# Patient Record
Sex: FEMALE | Race: WHITE | NOT HISPANIC OR LATINO | Employment: OTHER | URBAN - METROPOLITAN AREA
[De-identification: names, ages, dates, MRNs, and addresses within clinical notes are randomized per-mention and may not be internally consistent; named-entity substitution may affect disease eponyms.]

---

## 2017-07-10 ENCOUNTER — APPOINTMENT (OUTPATIENT)
Dept: GENERAL RADIOLOGY | Facility: CLINIC | Age: 72
End: 2017-07-10
Attending: FAMILY MEDICINE
Payer: MEDICARE

## 2017-07-10 ENCOUNTER — HOSPITAL ENCOUNTER (OUTPATIENT)
Facility: CLINIC | Age: 72
Setting detail: OBSERVATION
Discharge: HOME | End: 2017-07-11
Attending: FAMILY MEDICINE | Admitting: PEDIATRICS
Payer: MEDICARE

## 2017-07-10 ENCOUNTER — SURGERY (OUTPATIENT)
Age: 72
End: 2017-07-10

## 2017-07-10 DIAGNOSIS — R55 NEAR SYNCOPE: ICD-10-CM

## 2017-07-10 DIAGNOSIS — D62 ANEMIA DUE TO BLOOD LOSS, ACUTE: ICD-10-CM

## 2017-07-10 DIAGNOSIS — K92.2 ACUTE UPPER GI HEMORRHAGE: ICD-10-CM

## 2017-07-10 DIAGNOSIS — K25.4 GASTROINTESTINAL HEMORRHAGE ASSOCIATED WITH GASTRIC ULCER: ICD-10-CM

## 2017-07-10 DIAGNOSIS — N18.2 CKD (CHRONIC KIDNEY DISEASE) STAGE 2, GFR 60-89 ML/MIN: ICD-10-CM

## 2017-07-10 LAB
ABO + RH BLD: NORMAL
ABO + RH BLD: NORMAL
ALBUMIN SERPL-MCNC: 3 G/DL (ref 3.4–5)
ALP SERPL-CCNC: 51 U/L (ref 40–150)
ALT SERPL W P-5'-P-CCNC: 23 U/L (ref 0–50)
ANION GAP SERPL CALCULATED.3IONS-SCNC: 7 MMOL/L (ref 3–14)
AST SERPL W P-5'-P-CCNC: 18 U/L (ref 0–45)
BASOPHILS # BLD AUTO: 0 10E9/L (ref 0–0.2)
BASOPHILS NFR BLD AUTO: 0.4 %
BILIRUB SERPL-MCNC: 0.4 MG/DL (ref 0.2–1.3)
BLD GP AB SCN SERPL QL: NORMAL
BLOOD BANK CMNT PATIENT-IMP: NORMAL
BUN SERPL-MCNC: 43 MG/DL (ref 7–30)
CALCIUM SERPL-MCNC: 8 MG/DL (ref 8.5–10.1)
CHLORIDE SERPL-SCNC: 106 MMOL/L (ref 94–109)
CO2 SERPL-SCNC: 27 MMOL/L (ref 20–32)
CREAT SERPL-MCNC: 0.65 MG/DL (ref 0.52–1.04)
CRP SERPL-MCNC: 3.9 MG/L (ref 0–8)
DIFFERENTIAL METHOD BLD: ABNORMAL
EOSINOPHIL # BLD AUTO: 0 10E9/L (ref 0–0.7)
EOSINOPHIL NFR BLD AUTO: 0.5 %
ERYTHROCYTE [DISTWIDTH] IN BLOOD BY AUTOMATED COUNT: 13.4 % (ref 10–15)
ETHANOL SERPL-MCNC: <0.01 G/DL
GFR SERPL CREATININE-BSD FRML MDRD: ABNORMAL ML/MIN/1.7M2
GLUCOSE SERPL-MCNC: 119 MG/DL (ref 70–99)
HBA1C MFR BLD: 5.4 % (ref 4.3–6)
HCT VFR BLD AUTO: 31.2 % (ref 35–47)
HEMOCCULT STL QL: POSITIVE
HGB BLD-MCNC: 10 G/DL (ref 11.7–15.7)
HGB BLD-MCNC: 8.3 G/DL (ref 11.7–15.7)
HGB BLD-MCNC: 8.8 G/DL (ref 11.7–15.7)
IMM GRANULOCYTES # BLD: 0 10E9/L (ref 0–0.4)
IMM GRANULOCYTES NFR BLD: 0.1 %
LIPASE SERPL-CCNC: 277 U/L (ref 73–393)
LYMPHOCYTES # BLD AUTO: 2 10E9/L (ref 0.8–5.3)
LYMPHOCYTES NFR BLD AUTO: 23 %
MCH RBC QN AUTO: 30.9 PG (ref 26.5–33)
MCHC RBC AUTO-ENTMCNC: 32.1 G/DL (ref 31.5–36.5)
MCV RBC AUTO: 96 FL (ref 78–100)
MONOCYTES # BLD AUTO: 0.4 10E9/L (ref 0–1.3)
MONOCYTES NFR BLD AUTO: 4.7 %
NEUTROPHILS # BLD AUTO: 6.1 10E9/L (ref 1.6–8.3)
NEUTROPHILS NFR BLD AUTO: 71.3 %
PLATELET # BLD AUTO: 206 10E9/L (ref 150–450)
POTASSIUM SERPL-SCNC: 3.9 MMOL/L (ref 3.4–5.3)
PROT SERPL-MCNC: 6.3 G/DL (ref 6.8–8.8)
RBC # BLD AUTO: 3.24 10E12/L (ref 3.8–5.2)
SODIUM SERPL-SCNC: 140 MMOL/L (ref 133–144)
SPECIMEN EXP DATE BLD: NORMAL
TROPONIN I SERPL-MCNC: NORMAL UG/L (ref 0–0.04)
UPPER GI ENDOSCOPY: NORMAL
WBC # BLD AUTO: 8.5 10E9/L (ref 4–11)

## 2017-07-10 PROCEDURE — 36415 COLL VENOUS BLD VENIPUNCTURE: CPT | Performed by: FAMILY MEDICINE

## 2017-07-10 PROCEDURE — S0164 INJECTION PANTROPRAZOLE: HCPCS | Performed by: FAMILY MEDICINE

## 2017-07-10 PROCEDURE — 84484 ASSAY OF TROPONIN QUANT: CPT | Performed by: FAMILY MEDICINE

## 2017-07-10 PROCEDURE — 25000125 ZZHC RX 250: Performed by: FAMILY MEDICINE

## 2017-07-10 PROCEDURE — 99285 EMERGENCY DEPT VISIT HI MDM: CPT | Mod: 25

## 2017-07-10 PROCEDURE — 74020 XR ABDOMEN 2 VW: CPT | Mod: TC

## 2017-07-10 PROCEDURE — 93010 ELECTROCARDIOGRAM REPORT: CPT | Mod: Z6 | Performed by: FAMILY MEDICINE

## 2017-07-10 PROCEDURE — 86850 RBC ANTIBODY SCREEN: CPT | Performed by: FAMILY MEDICINE

## 2017-07-10 PROCEDURE — 40000296 ZZH STATISTIC ENDO RECOVERY CLASS 1:2 FIRST HOUR: Performed by: INTERNAL MEDICINE

## 2017-07-10 PROCEDURE — 96365 THER/PROPH/DIAG IV INF INIT: CPT

## 2017-07-10 PROCEDURE — 85018 HEMOGLOBIN: CPT | Performed by: INTERNAL MEDICINE

## 2017-07-10 PROCEDURE — 86140 C-REACTIVE PROTEIN: CPT | Performed by: FAMILY MEDICINE

## 2017-07-10 PROCEDURE — 93005 ELECTROCARDIOGRAM TRACING: CPT

## 2017-07-10 PROCEDURE — 83036 HEMOGLOBIN GLYCOSYLATED A1C: CPT | Performed by: PEDIATRICS

## 2017-07-10 PROCEDURE — 40000065 ZZH STATISTIC EKG NON-CHARGEABLE

## 2017-07-10 PROCEDURE — 25000128 H RX IP 250 OP 636: Performed by: INTERNAL MEDICINE

## 2017-07-10 PROCEDURE — 25000125 ZZHC RX 250: Performed by: INTERNAL MEDICINE

## 2017-07-10 PROCEDURE — 96361 HYDRATE IV INFUSION ADD-ON: CPT

## 2017-07-10 PROCEDURE — 82272 OCCULT BLD FECES 1-3 TESTS: CPT | Performed by: FAMILY MEDICINE

## 2017-07-10 PROCEDURE — 99219 ZZC INITIAL OBSERVATION CARE,LEVL II: CPT | Performed by: INTERNAL MEDICINE

## 2017-07-10 PROCEDURE — G0378 HOSPITAL OBSERVATION PER HR: HCPCS

## 2017-07-10 PROCEDURE — 85025 COMPLETE CBC W/AUTO DIFF WBC: CPT | Performed by: FAMILY MEDICINE

## 2017-07-10 PROCEDURE — 25000128 H RX IP 250 OP 636: Performed by: FAMILY MEDICINE

## 2017-07-10 PROCEDURE — 80053 COMPREHEN METABOLIC PANEL: CPT | Performed by: FAMILY MEDICINE

## 2017-07-10 PROCEDURE — 86901 BLOOD TYPING SEROLOGIC RH(D): CPT | Performed by: FAMILY MEDICINE

## 2017-07-10 PROCEDURE — 25000132 ZZH RX MED GY IP 250 OP 250 PS 637: Mod: GY | Performed by: INTERNAL MEDICINE

## 2017-07-10 PROCEDURE — 85018 HEMOGLOBIN: CPT | Performed by: FAMILY MEDICINE

## 2017-07-10 PROCEDURE — 83690 ASSAY OF LIPASE: CPT | Performed by: FAMILY MEDICINE

## 2017-07-10 PROCEDURE — 86900 BLOOD TYPING SEROLOGIC ABO: CPT | Performed by: FAMILY MEDICINE

## 2017-07-10 PROCEDURE — 96375 TX/PRO/DX INJ NEW DRUG ADDON: CPT

## 2017-07-10 PROCEDURE — 36415 COLL VENOUS BLD VENIPUNCTURE: CPT | Performed by: INTERNAL MEDICINE

## 2017-07-10 PROCEDURE — A9270 NON-COVERED ITEM OR SERVICE: HCPCS | Mod: GY | Performed by: INTERNAL MEDICINE

## 2017-07-10 PROCEDURE — 99207 ZZC MOONLIGHTING INDICATOR: CPT | Performed by: INTERNAL MEDICINE

## 2017-07-10 PROCEDURE — 43255 EGD CONTROL BLEEDING ANY: CPT | Performed by: INTERNAL MEDICINE

## 2017-07-10 PROCEDURE — 80320 DRUG SCREEN QUANTALCOHOLS: CPT | Performed by: FAMILY MEDICINE

## 2017-07-10 PROCEDURE — 96376 TX/PRO/DX INJ SAME DRUG ADON: CPT

## 2017-07-10 PROCEDURE — 99285 EMERGENCY DEPT VISIT HI MDM: CPT | Mod: 25 | Performed by: FAMILY MEDICINE

## 2017-07-10 RX ORDER — ONDANSETRON 2 MG/ML
4 INJECTION INTRAMUSCULAR; INTRAVENOUS EVERY 30 MIN PRN
Status: DISCONTINUED | OUTPATIENT
Start: 2017-07-10 | End: 2017-07-11 | Stop reason: HOSPADM

## 2017-07-10 RX ORDER — LIDOCAINE 40 MG/G
CREAM TOPICAL
Status: DISCONTINUED | OUTPATIENT
Start: 2017-07-10 | End: 2017-07-10

## 2017-07-10 RX ORDER — NALOXONE HYDROCHLORIDE 0.4 MG/ML
.1-.4 INJECTION, SOLUTION INTRAMUSCULAR; INTRAVENOUS; SUBCUTANEOUS
Status: DISCONTINUED | OUTPATIENT
Start: 2017-07-10 | End: 2017-07-11 | Stop reason: HOSPADM

## 2017-07-10 RX ORDER — LIDOCAINE 40 MG/G
CREAM TOPICAL
Status: DISCONTINUED | OUTPATIENT
Start: 2017-07-10 | End: 2017-07-11 | Stop reason: HOSPADM

## 2017-07-10 RX ORDER — ACETAMINOPHEN 325 MG/1
650 TABLET ORAL EVERY 4 HOURS PRN
Status: DISCONTINUED | OUTPATIENT
Start: 2017-07-10 | End: 2017-07-11 | Stop reason: HOSPADM

## 2017-07-10 RX ORDER — SODIUM CHLORIDE 9 MG/ML
1000 INJECTION, SOLUTION INTRAVENOUS CONTINUOUS
Status: DISCONTINUED | OUTPATIENT
Start: 2017-07-10 | End: 2017-07-11 | Stop reason: HOSPADM

## 2017-07-10 RX ADMIN — PANTOPRAZOLE SODIUM 40 MG: 40 INJECTION, POWDER, FOR SOLUTION INTRAVENOUS at 08:20

## 2017-07-10 RX ADMIN — MIDAZOLAM HYDROCHLORIDE 1 MG: 1 INJECTION, SOLUTION INTRAMUSCULAR; INTRAVENOUS at 12:28

## 2017-07-10 RX ADMIN — MIDAZOLAM HYDROCHLORIDE 1 MG: 1 INJECTION, SOLUTION INTRAMUSCULAR; INTRAVENOUS at 12:26

## 2017-07-10 RX ADMIN — MIDAZOLAM HYDROCHLORIDE 1 MG: 1 INJECTION, SOLUTION INTRAMUSCULAR; INTRAVENOUS at 12:25

## 2017-07-10 RX ADMIN — ACETAMINOPHEN 650 MG: 325 TABLET ORAL at 23:45

## 2017-07-10 RX ADMIN — MIDAZOLAM HYDROCHLORIDE 1 MG: 1 INJECTION, SOLUTION INTRAMUSCULAR; INTRAVENOUS at 12:35

## 2017-07-10 RX ADMIN — SODIUM CHLORIDE 1000 ML: 9 INJECTION, SOLUTION INTRAVENOUS at 11:08

## 2017-07-10 RX ADMIN — LIDOCAINE HYDROCHLORIDE 5 ML: 20 SOLUTION ORAL; TOPICAL at 12:24

## 2017-07-10 RX ADMIN — SODIUM CHLORIDE 1000 ML: 9 INJECTION, SOLUTION INTRAVENOUS at 08:22

## 2017-07-10 RX ADMIN — SODIUM CHLORIDE 8 MG/HR: 9 INJECTION, SOLUTION INTRAVENOUS at 21:11

## 2017-07-10 RX ADMIN — SODIUM CHLORIDE 8 MG/HR: 9 INJECTION, SOLUTION INTRAVENOUS at 11:53

## 2017-07-10 RX ADMIN — SODIUM CHLORIDE 1000 ML: 9 INJECTION, SOLUTION INTRAVENOUS at 21:11

## 2017-07-10 RX ADMIN — ONDANSETRON 4 MG: 2 INJECTION INTRAMUSCULAR; INTRAVENOUS at 08:32

## 2017-07-10 ASSESSMENT — ENCOUNTER SYMPTOMS
EYES NEGATIVE: 1
SHORTNESS OF BREATH: 0
UNEXPECTED WEIGHT CHANGE: 0
ABDOMINAL DISTENTION: 1
PALPITATIONS: 0
HEMATOLOGIC/LYMPHATIC NEGATIVE: 1
MUSCULOSKELETAL NEGATIVE: 1
SEIZURES: 0
ACTIVITY CHANGE: 1
COUGH: 0
ENDOCRINE NEGATIVE: 1
PSYCHIATRIC NEGATIVE: 1
WHEEZING: 0
CHILLS: 0
FEVER: 0
NAUSEA: 1
DIAPHORESIS: 1
HEADACHES: 0
ABDOMINAL PAIN: 1
RESPIRATORY NEGATIVE: 1
CONSTIPATION: 0
RECTAL PAIN: 0
COLOR CHANGE: 0
LIGHT-HEADEDNESS: 1
CHEST TIGHTNESS: 0
APPETITE CHANGE: 0
NUMBNESS: 0
DIARRHEA: 0
FATIGUE: 1
VOMITING: 1

## 2017-07-10 NOTE — IP AVS SNAPSHOT
[address] [facility]    [address] [facility]     [place] [address]    Phone:  [phone]                                       After Visit Summary   7/10/2017    Teagan Strickland    MRN: [id number]           After Visit Summary Signature Page     I have received my discharge instructions, and my questions have been answered. I have discussed any challenges I see with this plan with the nurse or doctor.    ..........................................................................................................................................  Patient/Patient Representative Signature      ..........................................................................................................................................  Patient Representative Print Name and Relationship to Patient    ..................................................               ................................................  Date                                            Time    ..........................................................................................................................................  Reviewed by Signature/Title    ...................................................              ..............................................  Date                                                            Time

## 2017-07-10 NOTE — IP AVS SNAPSHOT
MRN:[id number]                      After Visit Summary   7/10/2017    Teagan Strickland    MRN: [id number]           Thank you!     Thank you for choosing [facility] for your care. Our goal is always to provide you with excellent care. Hearing back from our patients is one way we can continue to improve our services. Please take a few minutes to complete the written survey that you may receive in the mail after you visit with us. Thank you!        Patient Information     Date Of Birth          1945        About your hospital stay     You were admitted on:  July 10, 2017 You last received care in the:  [address] [facility]    You were discharged on:  July 11, 2017       Who to Call     For medical emergencies, please call 911.  For non-urgent questions about your medical care, please call your primary care provider or clinic, [phone]  For questions related to your surgery, please call your surgery clinic        Attending Provider     Provider Specialty    Jayme Marcus,  [facility]    Bill Bueno MD Internal Medicine       Primary Care Provider Office Phone # Fax #    Gera Espinoza [phone]      After Care Instructions     Activity       Your activity upon discharge: activity as tolerated            Diet       Follow this diet upon discharge: Regular            Discharge Instructions       Avoid all anti inflammatories including aspirin                  Follow-up Appointments     Follow-up and recommended labs and tests        Follow up with primary provider within one week to recheck hemoglobin  Recheck with gastroenterology in 8 weeks for repeat endoscopy                  Your next 10 appointments already scheduled     Jul 18, 2017  9:45 AM CDT   Office Visit with Gera Espinoza MD   [facility] ([facility])    [address] [facility] [id number] [phone]           Bring a current  "list of meds and any records pertaining to this visit.  For Physicals, please bring immunization records and any forms needing to be filled out.  Please arrive 10 minutes early to complete paperwork.            Sep 01, 2017   Procedure with Mike Reynoso MD   [facility] ([facility])    [address] [place] [id number] [phone]              Further instructions from your care team       You are scheduled for a repeat EGD on , arrive at 8am, procedure at 9am with Dr. Reynoso.      Pending Results     No orders found for last 3 day(s).            Statement of Approval     Ordered          17 0605  I have reviewed and agree with all the recommendations and orders detailed in this document.  EFFECTIVE NOW     Approved and electronically signed by:  Mike Hoang MD             Admission Information     Date & Time Provider Department Dept. Phone    7/10/2017 Bill Bueno MD [address] Medical Surgical [phone]      Your Vitals Were     Blood Pressure Pulse Temperature Respirations Height Weight    127/67 98 97.3  F (36.3  C) (Oral) 18 1.575 m (5' 2\") 65.5 kg (144 lb 6.4 oz)    Last Period Pulse Oximetry BMI (Body Mass Index)             1995 98% 26.41 kg/m2         [facility] Information     Event[employer]ama lets you send messages to your doctor, view your test results, renew your prescriptions, schedule appointments and more. To sign up, go to [email or web address] . Click on \"Log in\" on the left side of the screen, which will take you to the Welcome page. Then click on \"Sign up Now\" on the right side of the page.     You will be asked to enter the access code listed below, as well as some personal information. Please follow the directions to create your username and password.     Your access code is: [email or web address]  Expires: 10/9/2017  6:15 AM     Your access code will  in 90 days. If you need help or a new code, please " call your [place] clinic or [phone].        Care EveryWhere ID     This is your Care EveryWhere ID. This could be used by other organizations to access your [place] medical records  [id number]        Equal Access to Services     NAKUL ORTIZ: Hadii aad ku hadantkylee Sonu, waaxda luqadaha, qaybta kaalmada adehienyada, leonardo terrybartolo willetthien saldana clarisse ortiz. So [facility] [phone].    ATENCIÓN: Si habla español, tiene a goetz disposición servicios gratuitos de asistencia lingüística. Llame al [phone].    We comply with applicable federal civil rights laws and [place] laws. We do not discriminate on the basis of race, color, national origin, age, disability sex, sexual orientation or gender identity.               Review of your medicines      START taking        Dose / Directions    pantoprazole 40 MG EC tablet   Commonly known as:  PROTONIX   Used for:  Gastrointestinal hemorrhage associated with gastric ulcer        Dose:  40 mg   Take 1 tablet (40 mg) by mouth 2 times daily Take 30-60 minutes before a meal.   Quantity:  60 tablet   Refills:  0         CONTINUE these medicines which have NOT CHANGED        Dose / Directions    multivitamin per tablet   Used for:  OTC -PATIENT CHOICE        ONE DAILY   Quantity:  0   Refills:  0       vitamin D 1000 UNITS capsule        Dose:  2 capsule   Take 2 capsules by mouth daily.   Refills:  0         STOP taking     ASPIRIN PO           co-enzyme Q-10 100 MG Caps capsule           HM VITAMIN B COMPLEX/VITAMIN C Tabs           MAGNESIUM PO           PROBIOTIC PO                Where to get your medicines      Some of these will need a paper prescription and others can be bought over the counter. Ask your nurse if you have questions.     Bring a paper prescription for each of these medications     pantoprazole 40 MG EC tablet                Protect others around you: Learn how to safely use, store and throw away your medicines at www.disposemymeds.org.              Medication List: This is a list of all your medications and when to take them. Check marks below indicate your daily home schedule. Keep this list as a reference.      Medications           Morning Afternoon Evening Bedtime As Needed    multivitamin per tablet   ONE DAILY                                pantoprazole 40 MG EC tablet   Commonly known as:  PROTONIX   Take 1 tablet (40 mg) by mouth 2 times daily Take 30-60 minutes before a meal.                                vitamin D 1000 UNITS capsule   Take 2 capsules by mouth daily.

## 2017-07-10 NOTE — PROGRESS NOTES
S-(situation): Patient registered to Observation. Patient arrived to room 247 via cart from ED and was transferred into bed per air irma.     B-(background): Passed out at home then had black emesis and stool; colonoscopy today; ulcer found and clipped    A-(assessment): A bit sleepy/woozy. Sats 100% on room air. Denies pain. Protonix drip and NS infusing.  at bedside. Hemoglobin was 8.8 after colonoscopy.     R-(recommendations): Orders and observation goals reviewed with pt and her     Nursing Observation criteria listed below was met:    Skin issues/needs documented:NA  Isolation needs addressed, if appropriate: NA  Fall Prevention: Education given and documented: Yes  Education Assessment documented:Yes  Education Documented (Pre-existing chronic infection such as, MRSA/VRE need education on admission): Yes  New medication patient education completed and documented (Possible Side Effects of Common Medications handout): Yes  Home medications if not able to send immediately home with family stored here: NA  Reminder note placed in discharge instructions: NA  Patient has discharge needs (If yes, please explain): No

## 2017-07-10 NOTE — ED NOTES
ED Nursing criteria listed below was addressed during verbal handoff:     Abnormal vitals: No  Abnormal results: Yes  Med Reconciliation completed: Yes  Meds given in ED: Yes  Any Overdue Meds: No  Core Measures: N/A  Isolation: N/A  Special needs: Yes  Skin assessment: Yes    Observation Patient  Education provided: Yes    Monique Lynch RN

## 2017-07-10 NOTE — ED NOTES
Got patient up to [facility]. Patient tolerated well. Patient voided and passed stool. Patient did not complain of any dizziness. After returning to bed, patient complained of nausea. Monique Lynch RN

## 2017-07-10 NOTE — ED NOTES
Pt up to BR with SBA. No complaints of dizziness or lightheadedness. No BM, void only. Pt back in bed and connected onto continuous VS and cardiac monitor.

## 2017-07-10 NOTE — H&P
[facility]    History and Physical  Hospitalist       Date of Admission:  7/10/2017  Date of Service (when I saw the patient): 07/10/17    Assessment & Plan       Active Problems:    Gastric ulcer with hemorrhage    Assessment: associated with GI bleed, nausea and lightheadedness.  Endoscopy done by GI with gastric ulcer found and felt to be the source of the recent bleeding.  Three clips placed and now on observation to make sure she doesn't have any recurrent vomiting.     Plan: register to observation, IV PPI drip for now, clear liquid diet, serial hgb, antiemetics prn, follow BP and pulse.   Will need repeat endoscopy repeated in 8 weeks with GI      Anemia due to blood loss, acute    Assessment: hgb dropped on admission from previous values and then further early this morning but that was after some IV fluids    Plan: recheck hgb now and follow    DVT Prophylaxis: anticipate less than 24 hour stay  Code Status: Full Code    Disposition: Expected discharge in 1 days once no signs of recurrent bleeding, hemodynamically stable, stable hgb.    Mike Hoang MD    Primary Care Physician   Gera Espinoza MD    Chief Complaint   Vomiting    History is obtained from the patient    History of Present Illness   Teagan Strickland is a 71 year old female who presents with vomiting, dark stool and vague epigastric pain.   She has had a several month history of vague epigastric discomfort which would seem to come on if she would sit for extended periods of time and was relieved with lying flat or getting up and moving around. She never noticed any change with eating and she had not been nauseated.  Last night she became nauseated and felt light headed.  She had an emesis of brown color material.  She felt somewhat better by bedtime so went to bed.  Around 3 am she awoke and had a bowel movement that was black in appearance and she then had another episode of vomiting of brown  material. She was concerned about her heart and took two 81 mg aspirin and came to the ED by EMS.    Past Medical History    I have reviewed this patient's medical history and updated it with pertinent information if needed.   Past Medical History:   Diagnosis Date     NO ACTIVE PROBLEMS        Past Surgical History   I have reviewed this patient's surgical history and updated it with pertinent information if needed.  Past Surgical History:   Procedure Laterality Date     COLONOSCOPY  3/7/2014    Procedure: COLONOSCOPY;  colonoscopy;  Surgeon: Jayme Dillon MD;  Location:  GI     HC COLONOSCOPY W BIOPSY  12/19/07     HC DILATION/CURETTAGE DIAG/THER NON OB       HC REMOVAL OF TONSILS,<11 Y/O         Prior to Admission Medications   Prior to Admission Medications   Prescriptions Last Dose Informant Patient Reported? Taking?   ASPIRIN PO 7/10/2017 at 0100  Yes Yes   Sig: Take 81 mg by mouth daily   B Complex-C-Folic Acid (HM VITAMIN B COMPLEX/VITAMIN C) TABS Past Week at Unknown time  Yes Yes   Sig: Take 1 tablet by mouth daily   Cholecalciferol (VITAMIN D) 1000 UNIT capsule Past Week at Unknown time  Yes Yes   Sig: Take 2 capsules by mouth daily.   MAGNESIUM PO Past Week at Unknown time  Yes Yes   Sig: Take 1 tablet by mouth daily   MULTIVITAMINS OR TABS Past Week at Unknown time  No Yes   Sig: ONE DAILY   Probiotic Product (PROBIOTIC PO) Past Week at Unknown time  Yes Yes   Sig: Take 1 capsule by mouth daily. Pearls Yeast Balancing   co-enzyme Q-10 (COQ10) 100 MG CAPS Past Week at Unknown time  Yes Yes   Sig: Take 1 capsule by mouth daily.      Facility-Administered Medications: None     Allergies   Allergies   Allergen Reactions     No Known Drug Allergies        Social History   I have reviewed this patient's social history and updated it with pertinent information if needed. Teagan COLLADO Em  reports that she quit smoking about 25 years ago. She has a 10.00 pack-year smoking history. She has never used  smokeless tobacco. She reports that she drinks alcohol. She reports that she does not use illicit drugs.    Family History   I have reviewed this patient's family history and updated it with pertinent information if needed.   Family History   Problem Relation Age of Onset     Arthritis Paternal Grandmother      CANCER Maternal Grandfather      prostate     DIABETES Mother      oral     OSTEOPOROSIS Mother      EYE* Mother      cataract     Thyroid Disease Mother      DIABETES Maternal Grandmother      insulin     DIABETES Maternal Aunt      insulin     DIABETES Maternal Uncle      insulin     DIABETES Brother      x2  oral meds     Respiratory Father      unknown specific problem--didn't keep contact with family     DIABETES Brother        Review of Systems   The 10 point Review of Systems is negative other than noted in the HPI or here.     Physical Exam   Temp: 98  F (36.7  C) Temp src: Oral BP: 120/58 Pulse: 75 Heart Rate: 81 Resp: 18 SpO2: 99 % O2 Device: None (Room air) Oxygen Delivery: 4 LPM  Vital Signs with Ranges  Temp:  [98  F (36.7  C)-99.5  F (37.5  C)] 98  F (36.7  C)  Pulse:  [75-89] 75  Heart Rate:  [] 81  Resp:  [5-26] 18  BP: ()/(52-83) 120/58  SpO2:  [97 %-100 %] 99 %  144 lbs 6.42 oz    Constitutional:   awake, alert, cooperative, no apparent distress, and appears stated age     Eyes:   Lids and lashes normal, pupils equal, round and reactive to light, extra ocular muscles intact, sclera clear, conjunctiva normal     ENT:   Normocephalic, without obvious abnormality, atraumatic, sinuses nontender on palpation, external ears without lesions, oral pharynx with moist mucous membranes, tonsils without erythema or exudates, gums normal and good dentition.     Neck:   Supple, symmetrical, trachea midline, no adenopathy, thyroid symmetric, not enlarged and no tenderness, skin normal     Hematologic / Lymphatic:   no cervical lymphadenopathy and no supraclavicular lymphadenopathy     Back:    Symmetric, no curvature, spinous processes are non-tender on palpation, paraspinous muscles are non-tender on palpation, no costal vertebral tenderness     Lungs:   No increased work of breathing, good air exchange, clear to auscultation bilaterally, no crackles or wheezing     Cardiovascular:   Normal apical impulse, regular rate and rhythm, normal S1 and S2, no S3 or S4, and no murmur noted     Abdomen:   normal bowel sounds, soft, non-distended, non-tender, no masses palpated, no hepatosplenomegally     Neurologic:   Awake, alert, oriented to name, place and time.  Cranial nerves II-XII are grossly intact.  Motor is 5 out of 5 bilaterally.    Sensory is intact.         Data   Data reviewed today:  I personally reviewed no images or EKG's today.    Recent Labs  Lab 07/10/17  1200 07/10/17  0758   WBC  --  8.5   HGB 8.8* 10.0*   MCV  --  96   PLT  --  206   NA  --  140   POTASSIUM  --  3.9   CHLORIDE  --  106   CO2  --  27   BUN  --  43*   CR  --  0.65   ANIONGAP  --  7   NERIS  --  8.0*   GLC  --  119*   ALBUMIN  --  3.0*   PROTTOTAL  --  6.3*   BILITOTAL  --  0.4   ALKPHOS  --  51   ALT  --  23   AST  --  18   LIPASE  --  277   TROPI  --  <0.015The 99th percentile for upper reference range is 0.045 ug/L.  Troponin values in the range of 0.045 - 0.120 ug/L may be associated with risks of adverse clinical events.       Recent Results (from the past 24 hour(s))   XR Abdomen 2 Views    Narrative    ABDOMEN TWO VIEWS   7/10/2017 8:43 AM     HISTORY: Epigatric pain. Anemia, gastrointestinal bleed.    COMPARISON: None.    FINDINGS: Calcific density measuring 0.5 cm is projected over the left  upper abdomen and could represent bowel contents, calcification of  adjacent cartilage, or left renal calculus. No abnormally dilated  gas-filled loops of bowel to suggest bowel obstruction. No free air is  seen under the hemidiaphragms. Levoconvex curvature of the mid lumbar  spine is noted. Degenerative changes are partially imaged  in the  spine. Moderate amount of stool is seen in the distal colon.      Impression    IMPRESSION:  1. Calcific density projected over the left upper abdomen could  represent bowel contents, cartilage calcification, or possibly left  intrarenal calculus.  2. Moderate amount of stool in the distal colon.  3. No evidence for bowel obstruction or free air.

## 2017-07-10 NOTE — ED NOTES
"Patient arrived to the ED with complaints of a syncope episode after going to the fridge to get cheese, then reported passing out. After the episode she vomited \"chocolate colored\" emesis and had a similar colored stool. Patient has been feeling well otherwise and has no history of this in the past. Monique Lynch RN    "

## 2017-07-10 NOTE — ED PROVIDER NOTES
"  History     Chief Complaint   Patient presents with     Loss of Consciousness     HPI  Teagan Strickland is a 71 year old female who presents to the ER via ambulance from her home with concerns about an episode of feeling \" whoozy, dizzy\" associated with a dark chocolate colored emesis.  She states that she noted that woozy episode when getting up from a sitting position to go to her refrigerator at about 10:30 last night.  She stated that she thinks she might passed out for a brief period of time. She stated that she felt better after sitting down for a period time and then eventually went to bed but didn't fall asleep until 2:00 this morning.  She stated she noted some mild discomfort to the upper mid abdomen.  She's had this discomfort on and off for several months starting this last winter.  She states that she continued to feel not quite right on awaking this morning and so discussed with her  that she felt she should come to the hospital to get checked out.  She decided to take a bath prior to coming to the hospital but while bathing felt nauseated and had a large emesis of a chocolate-colored material and felt more woozy again so 911 was called.  She states that she is feeling improved now and is no longer nauseated.  She had a bowel movement about 2:00 this morning and stated that it was quite dark in color but she didn't note any obvious chaz blood in the stool.  She denies being on any prescribed medications currently.  She states that she takes several over-the-counter multivitamin-type products but does not think any of them contain iron.  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      I have reviewed the Medications, Allergies, Past Medical and Surgical History, and Social History in the Epic system.  Patient " Active Problem List   Diagnosis     CARDIOVASCULAR SCREENING; LDL GOAL LESS THAN 160     Seasonal allergic rhinitis     CKD (chronic kidney disease) stage 2, GFR 60-89 ml/min       Past Medical History:   Diagnosis Date     NO ACTIVE PROBLEMS         Past Surgical History:   Procedure Laterality Date     COLONOSCOPY  3/7/2014    Procedure: COLONOSCOPY;  colonoscopy;  Surgeon: Jayme Dillon MD;  Location:  GI     HC COLONOSCOPY W BIOPSY  12/19/07     HC DILATION/CURETTAGE DIAG/THER NON OB       HC REMOVAL OF TONSILS,<13 Y/O         Family History   Problem Relation Age of Onset     Arthritis Paternal Grandmother      CANCER Maternal Grandfather      prostate     DIABETES Mother      oral     DIABETES Maternal Grandmother      insulin     DIABETES Maternal Aunt      insulin     DIABETES Maternal Uncle      insulin     DIABETES Brother      x2  oral meds     OSTEOPOROSIS Mother      EYE* Mother      cataract     Thyroid Disease Mother      Respiratory Father      unknown specific problem--didn't keep contact with family     DIABETES Brother        Social History     Social History     Marital status:      Spouse name: Segundo     Number of children: 2     Years of education: 13     Occupational History           Social History Main Topics     Smoking status: Former Smoker     Packs/day: 1.00     Years: 10.00     Quit date: 1/1/1992     Smokeless tobacco: Never Used     Alcohol use Yes      Comment: social     Drug use: No     Sexual activity: Not on file     Other Topics Concern      Service No     Blood Transfusions No     Caffeine Concern No     4-6 cups coffee/day   Diet soda  occ     Occupational Exposure No     Hobby Hazards No     Sleep Concern No     Stress Concern No     Weight Concern Yes     w'd like to lose 20#     Special Diet Not Asked          tries to add more veg./fruits.-eat healthy     Back Care No     Exercise Yes     at home-up and down steps     Bike Helmet No     Seat  "Belt Yes     Self-Exams Yes     off/on     Parent/Sibling W/ Cabg, Mi Or Angioplasty Before 65f 55m? No     Social History Narrative       Current Outpatient Rx   Medication Sig Dispense Refill     B Complex-C-Folic Acid (HM VITAMIN B COMPLEX/VITAMIN C) TABS Take 1 tablet by mouth daily       MAGNESIUM PO Take 1 tablet by mouth daily       ASPIRIN PO Take 81 mg by mouth daily       Cholecalciferol (VITAMIN D) 1000 UNIT capsule Take 2 capsules by mouth daily.       Probiotic Product (PROBIOTIC PO) Take 1 capsule by mouth daily. Pearls Yeast Balancing       co-enzyme Q-10 (COQ10) 100 MG CAPS Take 1 capsule by mouth daily.       MULTIVITAMINS OR TABS ONE DAILY 0 0       Allergies   Allergen Reactions     No Known Drug Allergies        Immunization History   Administered Date(s) Administered     Influenza (H1N1) 12/22/2009     Influenza (IIV3) 12/22/2009, 10/26/2012, 12/27/2013     TD (ADULT, 7+) 02/05/2001     Zoster vaccine, live 06/20/2013       Review of Systems   Constitutional: Positive for activity change, diaphoresis (patient states that she became sweaty with the 1st episode of lightheadedness that occurred at 10:30 PM last evening.) and fatigue (she states that she's been taking some melatonin supplement to help with her sleep because of increased fatigue.). Negative for appetite change, chills, fever and unexpected weight change.   HENT: Negative.    Eyes: Negative.    Respiratory: Negative.  Negative for cough, chest tightness, shortness of breath and wheezing.    Cardiovascular: Positive for leg swelling (he states that she noted a slight increase in swelling in her ankles for the last 2 weeks where she usually does not have swelling at all.). Negative for chest pain and palpitations.   Gastrointestinal: Positive for abdominal distention (Upper mid abdominal area), abdominal pain (\"Pressure\" mid upper abdomen), nausea and vomiting (Chocolate colored.). Negative for constipation, diarrhea and rectal pain. " "Blood in stool: Dark color early this AM.   Endocrine: Negative.    Genitourinary: Negative.    Musculoskeletal: Negative.    Skin: Negative.  Negative for color change and rash.   Neurological: Positive for light-headedness. Negative for seizures, numbness and headaches.   Hematological: Negative.    Psychiatric/Behavioral: Negative.    All other systems reviewed and are negative.      Physical Exam      Vitals:    07/10/17 0735 07/10/17 0745 07/10/17 0818 07/10/17 0819   BP:  114/71 113/74    Resp:  8 16 17   Temp:       TempSrc:       SpO2:    100%   Weight: 64.4 kg (142 lb)      Height: 1.575 m (5' 2\")          Physical Exam   Constitutional: She is oriented to person, place, and time. She appears well-developed and well-nourished. No distress (patient denies symptoms of lightheadedness, diaphoresis, or nausea at this time.).   HENT:   Head: Normocephalic and atraumatic.   Right Ear: External ear normal.   Mouth/Throat: Oropharynx is clear and moist.   Eyes: Conjunctivae and EOM are normal. Pupils are equal, round, and reactive to light. Right eye exhibits no discharge. Left eye exhibits no discharge. No scleral icterus.   Patient does not appear to have significantly pale conjunctivae this time.   Neck: Normal range of motion. Neck supple. No JVD present. No tracheal deviation present.   Cardiovascular: Normal rate, normal heart sounds and intact distal pulses.  Exam reveals no gallop and no friction rub.    No murmur heard.  Pulmonary/Chest: Effort normal. No stridor. No respiratory distress. She has no wheezes. She has no rales.   Abdominal: Soft. She exhibits no distension and no mass. Bowel sounds are increased. There is tenderness (mild tenderness to palpation to the epigastric area but otherwise negative exam for pain with palpation to the abdomen.). There is no rebound and no guarding.       Musculoskeletal: Normal range of motion.   Neurological: She is alert and oriented to person, place, and time. "   Skin: Skin is warm. No rash noted. She is not diaphoretic. No pallor.   Psychiatric: She has a normal mood and affect. Her behavior is normal. Judgment and thought content normal.   Nursing note and vitals reviewed.      ED Course     ED Course     Procedures             EKG Interpretation:      Interpreted by Jayme Marcus  Time reviewed:08:04   Symptoms at time of EKG: None   Rhythm: normal sinus   Rate: normal  Axis: NORMAL  Ectopy: none  Conduction: normal  ST Segments/ T Waves: No ST-T wave changes  Q Waves: none  Comparison to prior: Unchanged from 2004    Clinical Impression: normal EKG            Critical Care time:  none               Results for orders placed or performed during the hospital encounter of 07/10/17 (from the past 24 hour(s))   CBC with platelets differential   Result Value Ref Range    WBC 8.5 4.0 - 11.0 10e9/L    RBC Count 3.24 (L) 3.8 - 5.2 10e12/L    Hemoglobin 10.0 (L) 11.7 - 15.7 g/dL    Hematocrit 31.2 (L) 35.0 - 47.0 %    MCV 96 78 - 100 fl    MCH 30.9 26.5 - 33.0 pg    MCHC 32.1 31.5 - 36.5 g/dL    RDW 13.4 10.0 - 15.0 %    Platelet Count 206 150 - 450 10e9/L    Diff Method Automated Method     % Neutrophils 71.3 %    % Lymphocytes 23.0 %    % Monocytes 4.7 %    % Eosinophils 0.5 %    % Basophils 0.4 %    % Immature Granulocytes 0.1 %    Absolute Neutrophil 6.1 1.6 - 8.3 10e9/L    Absolute Lymphocytes 2.0 0.8 - 5.3 10e9/L    Absolute Monocytes 0.4 0.0 - 1.3 10e9/L    Absolute Eosinophils 0.0 0.0 - 0.7 10e9/L    Absolute Basophils 0.0 0.0 - 0.2 10e9/L    Abs Immature Granulocytes 0.0 0 - 0.4 10e9/L   Comprehensive metabolic panel   Result Value Ref Range    Sodium 140 133 - 144 mmol/L    Potassium 3.9 3.4 - 5.3 mmol/L    Chloride 106 94 - 109 mmol/L    Carbon Dioxide 27 20 - 32 mmol/L    Anion Gap 7 3 - 14 mmol/L    Glucose 119 (H) 70 - 99 mg/dL    Urea Nitrogen 43 (H) 7 - 30 mg/dL    Creatinine 0.65 0.52 - 1.04 mg/dL    GFR Estimate >90  Non  GFR Calc   >60  mL/min/1.7m2    GFR Estimate If Black >90   GFR Calc   >60 mL/min/1.7m2    Calcium 8.0 (L) 8.5 - 10.1 mg/dL    Bilirubin Total 0.4 0.2 - 1.3 mg/dL    Albumin 3.0 (L) 3.4 - 5.0 g/dL    Protein Total 6.3 (L) 6.8 - 8.8 g/dL    Alkaline Phosphatase 51 40 - 150 U/L    ALT 23 0 - 50 U/L    AST 18 0 - 45 U/L   Lipase   Result Value Ref Range    Lipase 277 73 - 393 U/L   CRP inflammation   Result Value Ref Range    CRP Inflammation 3.9 0.0 - 8.0 mg/L   Troponin I   Result Value Ref Range    Troponin I ES  0.000 - 0.045 ug/L     <0.015  The 99th percentile for upper reference range is 0.045 ug/L.  Troponin values in   the range of 0.045 - 0.120 ug/L may be associated with risks of adverse   clinical events.     Alcohol ethyl   Result Value Ref Range    Ethanol g/dL <0.01 <0.01 g/dL   ABO/Rh type and screen   Result Value Ref Range    ABO A     RH(D)  Pos     Antibody Screen Neg     Test Valid Only At [facility]     Specimen Expires 07/13/2017    Occult blood stool   Result Value Ref Range    Occult Blood Positive (A) NEG   XR Abdomen 2 Views    Narrative    ABDOMEN TWO VIEWS   7/10/2017 8:43 AM     HISTORY: Epigatric pain. Anemia, gastrointestinal bleed.    COMPARISON: None.    FINDINGS: Calcific density measuring 0.5 cm is projected over the left  upper abdomen and could represent bowel contents, calcification of  adjacent cartilage, or left renal calculus. No abnormally dilated  gas-filled loops of bowel to suggest bowel obstruction. No free air is  seen under the hemidiaphragms. Levoconvex curvature of the mid lumbar  spine is noted. Degenerative changes are partially imaged in the  spine. Moderate amount of stool is seen in the distal colon.      Impression    IMPRESSION:  1. Calcific density projected over the left upper abdomen could  represent bowel contents, cartilage calcification, or possibly left  intrarenal calculus.  2. Moderate amount of stool in the distal colon.  3. No  evidence for bowel obstruction or free air.     Medications ordered to be administered in the ER:    Medications   lidocaine 1 % 1 mL (not administered)   lidocaine (LMX4) kit (not administered)   sodium chloride (PF) 0.9% PF flush 3 mL (not administered)   sodium chloride (PF) 0.9% PF flush 3 mL (3 mLs Intracatheter Given 7/10/17 0822)   0.9% sodium chloride BOLUS (1,000 mLs Intravenous New Bag 7/10/17 0822)     Followed by   0.9% sodium chloride infusion (not administered)   ondansetron (ZOFRAN) injection 4 mg (4 mg Intravenous Given 7/10/17 0832)   pantoprazole (PROTONIX) 40 mg IV push injection (40 mg Intravenous Given 7/10/17 0820)         Assessments & Plan (with Medical Decision Making)  8:28 AM  Patient notified of her initial hemoglobin level results = 10.0.  She states that she usually runs between 14 and 15 her hemoglobin level.  She states that she felt somewhat nauseated while getting up onto the commode but now feels better again.  She states that as long as she is lying down and not exerting herself she feels fine but admits that when she exerts herself she feels fatigued and nauseated.    9:01 AM  Re-examined and discussed findings consistent with upper GI bleed. She states she is feeling improved. No additional N/V symptoms. Denies abdominal pain. Discussed need for hospitalization with the patient. Hospitalist paged.  9:41 AM  Hospitalist called back and I spoke to Dr. Bueno.  He stated that is extremely busy in the hospital and was not sure she felt comfortable except this patient to his service unless he knew that GI would be able to do an endoscopy procedure later today.  He asked that I contact GI to see if this is a possibility.  Otherwise, he felt that the patient should stay in the ER and have her hemoglobin rechecked at noon today and if stable then he would likely be willing to accept the patient to his service. However, he stated that if there is significant drop in her Hgb level from  her 10.0 level from earlier this morning then he felt she should be transferred to different facility.  The patient notified of the above plan. GI service contacted and awaiting call back.  11:27 AM  I received a call back from the GI specialist, Dr. Raffaele Neely.  He stated that he would like to do an upper endoscopy later today and to keep the patient nothing by mouth until they can get the procedure set up and done.  Further recommendations based on that procedure.  Repeat Hgb planned at noon.  13:20 PM  Patient returned from GI procedure and I spoke with Dr. Neely. He stated he found a gastric ulcer not actively bleeding during the exam but appeared to be likely source of GI bleed and he placed 3 metal clips over the site to prevent additional bleeding. He recommended she be monitored in the hospital overnight.  I contacted Dr. Bueno, hospitalist. He agreed to accept the patient to his care. He asked that I place initial admission orders in the [facility] EMR and this was done.  The patient with acute upper GI bleed secondary to gastric ulcer possible caused by daily aspirin therapy. Anemia felt secondary to acute GI bleed. Near syncope and lightheadedness felt secondary to GI bleeding and anemia.     I have reviewed the nursing notes.    I have reviewed the findings, diagnosis, plan and need for a period of observation in the hospital with the patient.       Final diagnoses:   Near syncope   Acute upper GI hemorrhage   Anemia due to blood loss, acute   CKD (chronic kidney disease) stage 2, GFR 60-89 ml/min       7/10/2017   [facility] EMERGENCY DEPARTMENT     Jayme Marcus, DO  07/10/17 1457

## 2017-07-10 NOTE — ED NOTES
Bed: ED07  Expected date: 7/10/17  Expected time: 12:21 PM  Means of arrival:   Comments:  CB- Surgery

## 2017-07-10 NOTE — CONSULTS
"[facility] GI Pre-Procedure Physical Assessment    Teagan Strickland MRN# [id number]   Age: 71 year old YOB: 1945      Date of Surgery: 7/10/2017  Location [facility]      Date of Exam 7/10/2017 Facility (Same day)         Primary care provider: Gera Espinoza         Active problem list:   Patient Active Problem List   Diagnosis     CARDIOVASCULAR SCREENING; LDL GOAL LESS THAN 160     Seasonal allergic rhinitis     CKD (chronic kidney disease) stage 2, GFR 60-89 ml/min            Medications (include herbals and vitamins):   Any Plavix use in the last 7 days?  No     Current Facility-Administered Medications   Medication     lidocaine 1 % 1 mL     lidocaine (LMX4) kit     sodium chloride (PF) 0.9% PF flush 3 mL     sodium chloride (PF) 0.9% PF flush 3 mL     0.9% sodium chloride infusion     ondansetron (ZOFRAN) injection 4 mg     pantoprazole (PROTONIX) 80 mg in NaCl 0.9 % 100 mL infusion             Allergies:      Allergies   Allergen Reactions     No Known Drug Allergies      Allergy to Latex?  No  Allergy to tape?    No          Social History:     Social History   Substance Use Topics     Smoking status: Former Smoker     Packs/day: 1.00     Years: 10.00     Quit date: 1/1/1992     Smokeless tobacco: Never Used     Alcohol use Yes      Comment: social            Physical Exam:   All vitals have been reviewed  Blood pressure 106/59, temperature 98  F (36.7  C), temperature source Oral, resp. rate 17, height 1.575 m (5' 2\"), weight 64.4 kg (142 lb), last menstrual period 01/01/1995, SpO2 98 %.  Airway assessment:   Patient is able to open mouth wide      Lungs:   No increased work of breathing, good air exchange, clear to auscultation bilaterally, no crackles or wheezing      Cardiovascular:   Normal apical impulse, regular rate and rhythm, normal S1 and S2, no S3 or S4, and no murmur noted           Lab / Radiology Results:   All laboratory data reviewed          " Assessment:   Appropriately NPO  Chief complaint or anatomic assessment of involved area: gastrointestinal  bleeding         Plan:   Moderate (conscious) sedation     Patient's active problems diagnostically and therapeutically optimized for the planned procedure  Risks, benefits, alternatives to procedure explained and consent obtained  P1 (normal healthy patient)  Orders and progress notes are in the chart  Discharge from Phase 1 and / or Phase 2 recovery when patient meets criteria    I have reviewed the history and physical, lab finding(s), diagnostic data, medicaitons, and the plan for sedation.  I have determined this patient to be an appropriate candidate for the planned sedation / procedure and have reassessed the patient immediately prior to sedation / procedure.    I have personally and medically directed the administration of medications used.    Raffaele Neely MD

## 2017-07-11 VITALS
WEIGHT: 144.4 LBS | RESPIRATION RATE: 18 BRPM | DIASTOLIC BLOOD PRESSURE: 67 MMHG | SYSTOLIC BLOOD PRESSURE: 127 MMHG | BODY MASS INDEX: 26.57 KG/M2 | OXYGEN SATURATION: 98 % | TEMPERATURE: 97.3 F | HEART RATE: 98 BPM | HEIGHT: 62 IN

## 2017-07-11 LAB
HGB BLD-MCNC: 8 G/DL (ref 11.7–15.7)
HGB BLD-MCNC: 8 G/DL (ref 11.7–15.7)

## 2017-07-11 PROCEDURE — 25000128 H RX IP 250 OP 636: Performed by: FAMILY MEDICINE

## 2017-07-11 PROCEDURE — G0378 HOSPITAL OBSERVATION PER HR: HCPCS

## 2017-07-11 PROCEDURE — 36415 COLL VENOUS BLD VENIPUNCTURE: CPT | Performed by: INTERNAL MEDICINE

## 2017-07-11 PROCEDURE — 99217 ZZC OBSERVATION CARE DISCHARGE: CPT | Performed by: INTERNAL MEDICINE

## 2017-07-11 PROCEDURE — 85018 HEMOGLOBIN: CPT | Performed by: INTERNAL MEDICINE

## 2017-07-11 RX ORDER — PANTOPRAZOLE SODIUM 40 MG/1
40 TABLET, DELAYED RELEASE ORAL 2 TIMES DAILY
Qty: 60 TABLET | Refills: 0 | Status: SHIPPED | OUTPATIENT
Start: 2017-07-11 | End: 2017-07-17

## 2017-07-11 RX ADMIN — SODIUM CHLORIDE 1000 ML: 9 INJECTION, SOLUTION INTRAVENOUS at 02:40

## 2017-07-11 NOTE — PROGRESS NOTES
S-(situation): Patient discharged to Home via W/C with family    B-(background): Observation goals met Goals have been met    A-(assessment): Please review system assessment and VS.  Note that pt sisi regular diet for breakfast.  Up and about in her rm.      R-(recommendations): Discharge instructions reviewed with pt. Listed belongings gathered and returned to patient.sent with pt.  Patient Education resolved: Yes  New medications-Pt. Has been educated about reason of use and side effects Yes  Home and hospital acquired medications returned to patient NA  Medication Bin checked and emptied on discharge Yes

## 2017-07-11 NOTE — DISCHARGE INSTRUCTIONS
You are scheduled for a repeat EGD on Sept. 1st, arrive at 8am, procedure at 9am with Dr. Reynoso.

## 2017-07-11 NOTE — PROGRESS NOTES
S:  End of shift note  B:  gib  A:  No stools for 12 hours.  Denies dizziness when up. Ambulated in hallway.  Tolerating water.  C/o headache at beginning of shift, relief with tylenol.  Last hgb 8.0.  IVF's infusing at 125cc/hr.  Will advance diet this am.   R:  Cont to monitor.

## 2017-07-11 NOTE — DISCHARGE SUMMARY
[facility]    Discharge Summary  Hospitalist    Date of Admission:  7/10/2017  Date of Discharge:  7/11/2017  Discharging Provider: Mike Hoang MD  Date of Service (when I saw the patient): 07/11/17    Discharge Diagnoses   Active Problems:    Gastric ulcer with hemorrhage    Anemia due to blood loss, acute       History of Present Illness   Copied from H&P:   Teagan Strickland is a 71 year old female who presents with vomiting, dark stool and vague epigastric pain.   She has had a several month history of vague epigastric discomfort which would seem to come on if she would sit for extended periods of time and was relieved with lying flat or getting up and moving around. She never noticed any change with eating and she had not been nauseated.  Last night she became nauseated and felt light headed.  She had an emesis of brown color material.  She felt somewhat better by bedtime so went to bed.  Around 3 am she awoke and had a bowel movement that was black in appearance and she then had another episode of vomiting of brown material. She was concerned about her heart and took two 81 mg aspirin and came to the ED by [facility]   Teagan Strickland was admitted on 7/10/2017.  The following problems were addressed during her hospitalization:    Active Problems:    Gastric ulcer with hemorrhage    Assessment: She was started on a protonix drip and had serial hgb checks done.  Her hgb stabilized at 8 with no further clinical evidence of bleeding.  She remained hemodynamically stable and tolerating advancing diet.  She was at that point felt safe for discharge    Plan: discharge home on protonix, to avoid NSAIDS, recheck with PMD in one week to recheck hgb and in 8 weeks with GI for repeat EGD      Anemia due to blood loss, acute    Assessment: as above    Plan: as above      Mike Hoang MD    Significant Results and Procedures   EGD    Pending Results   These results  will be followed up by   Unresulted Labs Ordered in the Past 30 Days of this Admission     No orders found for last 61 day(s).          Code Status   Full Code       Primary Care Physician   Gera Espinoza MD    Physical Exam   Temp: 97.3  F (36.3  C) Temp src: Oral BP: 127/67 Pulse: 98 Heart Rate: 98 Resp: 18 SpO2: 98 % O2 Device: None (Room air) Oxygen Delivery: 4 LPM  Vitals:    07/10/17 0735 07/10/17 1450   Weight: 64.4 kg (142 lb) 65.5 kg (144 lb 6.4 oz)     Vital Signs with Ranges  Temp:  [97.1  F (36.2  C)-99.5  F (37.5  C)] 97.3  F (36.3  C)  Pulse:  [75-98] 98  Heart Rate:  [] 98  Resp:  [5-26] 18  BP: ()/(52-83) 127/67  SpO2:  [97 %-100 %] 98 %  I/O last 3 completed shifts:  In: -   Out: 800 [Stool:800]    Lungs:  CTA throughout  CV:  RRR without murmur  Abdomen: +BS, Soft, NT        Discharge Disposition   Discharged to home  Condition at discharge: Stable    Consultations This Hospital Stay   None    Time Spent on this Encounter   I, Mike Hoang MD, personally saw the patient today and spent less than or equal to 30 minutes discharging this patient.    Discharge Orders     Follow-up and recommended labs and tests    Follow up with primary provider within one week to recheck hemoglobin  Recheck with gastroenterology in 8 weeks for repeat endoscopy     Activity   Your activity upon discharge: activity as tolerated     Discharge Instructions   Avoid all anti inflammatories including aspirin     Diet   Follow this diet upon discharge: Regular       Discharge Medications   Current Discharge Medication List      START taking these medications    Details   pantoprazole (PROTONIX) 40 MG EC tablet Take 1 tablet (40 mg) by mouth 2 times daily Take 30-60 minutes before a meal.  Qty: 60 tablet, Refills: 0    Associated Diagnoses: Gastrointestinal hemorrhage associated with gastric ulcer         CONTINUE these medications which have NOT CHANGED    Details   Cholecalciferol (VITAMIN D) 1000  UNIT capsule Take 2 capsules by mouth daily.      MULTIVITAMINS OR TABS ONE DAILY  Qty: 0, Refills: 0    Associated Diagnoses: OTC -PATIENT CHOICE         STOP taking these medications       B Complex-C-Folic Acid (HM VITAMIN B COMPLEX/VITAMIN C) TABS Comments:   Reason for Stopping:         MAGNESIUM PO Comments:   Reason for Stopping:         ASPIRIN PO Comments:   Reason for Stopping:         Probiotic Product (PROBIOTIC PO) Comments:   Reason for Stopping:         co-enzyme Q-10 (COQ10) 100 MG CAPS Comments:   Reason for Stopping:             Allergies   Allergies   Allergen Reactions     No Known Drug Allergies      Data   Most Recent 3 CBC's:  Recent Labs   Lab Test  07/11/17   0518  07/10/17   2350  07/10/17   1810   07/10/17   0758   WBC   --    --    --    --   8.5   HGB  8.0*  8.0*  8.3*   < >  10.0*   MCV   --    --    --    --   96   PLT   --    --    --    --   206    < > = values in this interval not displayed.      Most Recent 3 BMP's:  Recent Labs   Lab Test  07/10/17   0758  08/21/13   1018 07/31/13   NA  140  140   --    POTASSIUM  3.9  3.9   --    CHLORIDE  106  102   --    CO2  27  28   --    BUN  43*  16   --    CR  0.65  0.76  1.11   ANIONGAP  7  10.3   --    NERIS  8.0*  9.3   --    GLC  119*  99  72     Most Recent 2 LFT's:  Recent Labs   Lab Test  07/10/17   0758   AST  18   ALT  23   ALKPHOS  51   BILITOTAL  0.4     Most Recent INR's and Anticoagulation Dosing History:  Anticoagulation Dose History     There is no flowsheet data to display.        Most Recent 3 Troponin's:  Recent Labs   Lab Test  07/10/17   0758   TROPI  <0.015  The 99th percentile for upper reference range is 0.045 ug/L.  Troponin values in   the range of 0.045 - 0.120 ug/L may be associated with risks of adverse   clinical events.       Most Recent Cholesterol Panel:  Recent Labs   Lab Test 07/31/13  10/26/12   1226   CHOL  248*   --    LDL   --   119   TRIG  63   --      Most Recent 6 Bacteria Isolates From Any Culture  (See EPIC Reports for Culture Details):No lab results found.  Most Recent TSH, T4 and A1c Labs:  Recent Labs   Lab Test  07/10/17   0800 07/31/13   TSH   --   1.8   A1C  5.4  5.7

## 2017-07-12 ENCOUNTER — TELEPHONE (OUTPATIENT)
Dept: FAMILY MEDICINE | Facility: OTHER | Age: 72
End: 2017-07-12

## 2017-07-12 NOTE — TELEPHONE ENCOUNTER
"Hospital/TCU/ED for chronic condition Discharge Protocol    \"Hi, my name is Lashawn Coffman, a registered nurse, and I am calling from [facility].  I am calling to follow up and see how things are going for you after your recent emergency visit/hospital/TCU stay.\"    Tell me how you are doing now that you are home?\" Slow but I'm doing OK.\"      Discharge Instructions    \"Let's review your discharge instructions.  What is/are the follow-up recommendations?  Pt. Response: Follow up with PCP    \"Has an appointment with your primary care provider been scheduled?\"   Yes. (confirm)    \"When you see the provider, I would recommend that you bring your medications with you.\"    Medications    \"Tell me what changed about your medicines when you discharged?\"    Changes to chronic meds?    0-1    \"What questions do you have about your medications?\"    None     New diagnoses of heart failure, COPD, diabetes, or MI?    No              Medication reconciliation completed? Yes  Was MTM referral placed (*Make sure to put transitions as reason for referral)?   No    Call Summary    \"What questions or concerns do you have about your recent visit and your follow-up care?\"     none    \"If you have questions or things don't continue to improve, we encourage you contact us through the main clinic number (give number).  Even if the clinic is not open, triage nurses are available 24/7 to help you.     We would like you to know that our clinic has extended hours (provide information).  We also have urgent care (provide details on closest location and hours/contact info)\"      \"Thank you for your time and take care!\"    Lashawn Coffman RN  [facility]  "

## 2017-07-17 DIAGNOSIS — K25.4 GASTROINTESTINAL HEMORRHAGE ASSOCIATED WITH GASTRIC ULCER: ICD-10-CM

## 2017-07-17 RX ORDER — PANTOPRAZOLE SODIUM 40 MG/1
40 TABLET, DELAYED RELEASE ORAL DAILY
Qty: 60 TABLET | Refills: 3 | Status: SHIPPED | OUTPATIENT
Start: 2017-07-17 | End: 2017-10-11

## 2017-07-17 NOTE — TELEPHONE ENCOUNTER
Pharmacy requested a change in dosing to once a day due to insurance only paying for once a day on Pantoprazole.

## 2017-07-18 ENCOUNTER — OFFICE VISIT (OUTPATIENT)
Dept: FAMILY MEDICINE | Facility: OTHER | Age: 72
End: 2017-07-18
Payer: COMMERCIAL

## 2017-07-18 VITALS
HEIGHT: 62 IN | BODY MASS INDEX: 25.95 KG/M2 | RESPIRATION RATE: 16 BRPM | WEIGHT: 141 LBS | SYSTOLIC BLOOD PRESSURE: 113 MMHG | HEART RATE: 71 BPM | OXYGEN SATURATION: 100 % | DIASTOLIC BLOOD PRESSURE: 64 MMHG | TEMPERATURE: 97.7 F

## 2017-07-18 DIAGNOSIS — D62 ANEMIA DUE TO BLOOD LOSS, ACUTE: Primary | ICD-10-CM

## 2017-07-18 PROBLEM — Z13.6 CARDIOVASCULAR SCREENING; LDL GOAL LESS THAN 130: Status: ACTIVE | Noted: 2017-07-18

## 2017-07-18 LAB
ERYTHROCYTE [DISTWIDTH] IN BLOOD BY AUTOMATED COUNT: 13.3 % (ref 10–15)
HCT VFR BLD AUTO: 25.9 % (ref 35–47)
HGB BLD-MCNC: 8.6 G/DL (ref 11.7–15.7)
MCH RBC QN AUTO: 31.7 PG (ref 26.5–33)
MCHC RBC AUTO-ENTMCNC: 33.2 G/DL (ref 31.5–36.5)
MCV RBC AUTO: 96 FL (ref 78–100)
PLATELET # BLD AUTO: 375 10E9/L (ref 150–450)
RBC # BLD AUTO: 2.71 10E12/L (ref 3.8–5.2)
WBC # BLD AUTO: 7.2 10E9/L (ref 4–11)

## 2017-07-18 PROCEDURE — 85027 COMPLETE CBC AUTOMATED: CPT | Performed by: FAMILY MEDICINE

## 2017-07-18 PROCEDURE — 36415 COLL VENOUS BLD VENIPUNCTURE: CPT | Performed by: FAMILY MEDICINE

## 2017-07-18 PROCEDURE — 99495 TRANSJ CARE MGMT MOD F2F 14D: CPT | Performed by: FAMILY MEDICINE

## 2017-07-18 ASSESSMENT — PAIN SCALES - GENERAL: PAINLEVEL: NO PAIN (0)

## 2017-07-18 NOTE — MR AVS SNAPSHOT
"              After Visit Summary   7/18/2017    Teagan Strickland    MRN: [id number]           Patient Information     Date Of Birth          1945        Visit Information        Provider Department      7/18/2017 9:45 AM Gera Espinoza MD [facility]        Today's Diagnoses     Anemia due to blood loss, acute    -  1       Follow-ups after your visit        Your next 10 appointments already scheduled     Sep 01, 2017   Procedure with Mike Reynoso MD   [facility] ([facility])    [address] [place] [id number] [phone]              Who to contact     If you have questions or need follow up information about today's clinic visit or your schedule please contact [facility] directly at [phone].  Normal or non-critical lab and imaging results will be communicated to you by MyChart, letter or phone within 4 business days after the clinic has received the results. If you do not hear from us within 7 days, please contact the clinic through MyChart or phone. If you have a critical or abnormal lab result, we will notify you by phone as soon as possible.  Submit refill requests through [employer] or call your pharmacy and they will forward the refill request to us. Please allow 3 business days for your refill to be completed.          Additional Information About Your Visit        MyChart Information     [employer] lets you send messages to your doctor, view your test results, renew your prescriptions, schedule appointments and more. To sign up, go to [email or web address] . Click on \"Log in\" on the left side of the screen, which will take you to the Welcome page. Then click on \"Sign up Now\" on the right side of the page.     You will be asked to enter the access code listed below, as well as some personal information. Please follow the directions to create your username and password.     Your access code is: " "[email or web address]  Expires: 10/9/2017  6:15 AM     Your access code will  in 90 days. If you need help or a new code, please call your [place] clinic or [phone].        Care EveryWhere ID     This is your Care EveryWhere ID. This could be used by other organizations to access your [place] medical records  [id number]        Your Vitals Were     Pulse Temperature Respirations Height Last Period Pulse Oximetry    71 97.7  F (36.5  C) (Temporal) 16 5' 1.75\" (1.568 m) 1995 100%    BMI (Body Mass Index)                   26 kg/m2            Blood Pressure from Last 3 Encounters:   17 113/64   17 127/67   01/09/15 128/68    Weight from Last 3 Encounters:   17 141 lb (64 kg)   07/10/17 144 lb 6.4 oz (65.5 kg)   01/09/15 141 lb (64 kg)              We Performed the Following     CBC with platelets        Primary Care Provider Office Phone # Fax #    Gera Espinoza [phone]       [facility] [address] [facility] [address]        Equal Access to Services     NAKUL PITTMAN : Hadii mando valentine hadasho Soomaali, waaxda luqadaha, qaybta kaalmada adeegyada, leonardo robb . So [facility] [phone].    ATENCIÓN: Si habla español, tiene a goetz disposición servicios gratuitos de asistencia lingüística. Llame al [phone].    We comply with applicable federal civil rights laws and [place] laws. We do not discriminate on the basis of race, color, national origin, age, disability sex, sexual orientation or gender identity.            Thank you!     Thank you for choosing [facility]  for your care. Our goal is always to provide you with excellent care. Hearing back from our patients is one way we can continue to improve our services. Please take a few minutes to complete the written survey that you may receive in the mail after your visit with us. Thank you!             Your Updated Medication List - Protect others around you: " Learn how to safely use, store and throw away your medicines at www.disposemymeds.org.          This list is accurate as of: 7/18/17 10:44 AM.  Always use your most recent med list.                   Brand Name Dispense Instructions for use Diagnosis    multivitamin per tablet     0    ONE DAILY    OTC -PATIENT CHOICE       pantoprazole 40 MG EC tablet    PROTONIX    60 tablet    Take 1 tablet (40 mg) by mouth daily Take 30-60 minutes before a meal.    Gastrointestinal hemorrhage associated with gastric ulcer       vitamin D 1000 UNITS capsule      Take 2 capsules by mouth daily.

## 2017-07-18 NOTE — PROGRESS NOTES
"SUBJECTIVE:                                                    Teagan Strickland is a 71 year old female who presents to clinic today for the following health issues:        Hospital Follow-up Visit:    Hospital/Nursing Home/IP Rehab Facility: [facility]  Date of Admission: 7/10/17  Date of Discharge: 7/11/17  Reason(s) for Admission: bleeding ulcer            Problems taking medications regularly:  None       Medication changes since discharge: updated       Problems adhering to non-medication therapy:  None    Summary of hospitalization:  [facility] discharge summary reviewed  Diagnostic Tests/Treatments reviewed.  Follow up needed: hbg  Other Healthcare Providers Involved in Patient s Care:    Update since discharge: improved.     Post Discharge Medication Reconciliation: discharge medications reconciled, continue medications without change.  Plan of care communicated with patient     Coding guidelines for this visit:  Type of Medical   Decision Making Face-to-Face Visit       within 7 Days of discharge Face-to-Face Visit        within 14 days of discharge   Moderate Complexity [id number]   High Complexity 88305 [id number]            Problem list and histories reviewed & adjusted, as indicated.    C: NEGATIVE for fever, chills, change in weight  CONSTITUTIONAL:fatigue  E/M: NEGATIVE for ear, mouth and throat problems  R: NEGATIVE for significant cough or SOB  CV: NEGATIVE for chest pain, palpitations or peripheral edema    OBJECTIVE:                                                    /64 (BP Location: Left arm, Patient Position: Chair)  Pulse 71  Temp 97.7  F (36.5  C) (Temporal)  Resp 16  Ht 5' 1.75\" (1.568 m)  Wt 141 lb (64 kg)  LMP 01/01/1995  SpO2 100%  BMI 26 kg/m2  Body mass index is 26 kg/(m^2).         ASSESSMENT/PLAN:                                                    Pt recently admitted with GI bleed,had ulcer likley partly from NSAIDS,did not need transfusion,no more " hematemesis and does have fatigue. Hbg stable at 8.6.   Plan: Avoid asa and nsaids,continue protonix,repeat hbg in 3 weeks and rescope in 2 months.    Gera Espinoza MD, MD  [facility]

## 2017-08-18 ENCOUNTER — TELEPHONE (OUTPATIENT)
Dept: FAMILY MEDICINE | Facility: OTHER | Age: 72
End: 2017-08-18

## 2017-08-18 DIAGNOSIS — D62 ANEMIA DUE TO BLOOD LOSS, ACUTE: Primary | ICD-10-CM

## 2017-08-18 NOTE — TELEPHONE ENCOUNTER
Reason for Call: Request for an order or referral:    Order or referral being requested: Patient was advised to have her hemoglobin checked in 3 weeks, please advise if/when patient should go to lab or be seen in Dr JANE's absence    Date needed: as soon as possible    Has the patient been seen by the PCP for this problem? YES    Additional comments:     Phone number Patient can be reached at:  Home number on file [phone] (home)    Best Time:      Can we leave a detailed message on this number?  YES    Call taken on 8/18/2017 at 10:05 AM by Ceci Gamble

## 2017-08-23 DIAGNOSIS — D62 ANEMIA DUE TO BLOOD LOSS, ACUTE: ICD-10-CM

## 2017-08-23 LAB
ERYTHROCYTE [DISTWIDTH] IN BLOOD BY AUTOMATED COUNT: 16.1 % (ref 10–15)
HCT VFR BLD AUTO: 28.9 % (ref 35–47)
HGB BLD-MCNC: 9.2 G/DL (ref 11.7–15.7)
MCH RBC QN AUTO: 27.1 PG (ref 26.5–33)
MCHC RBC AUTO-ENTMCNC: 31.8 G/DL (ref 31.5–36.5)
MCV RBC AUTO: 85 FL (ref 78–100)
PLATELET # BLD AUTO: 365 10E9/L (ref 150–450)
RBC # BLD AUTO: 3.4 10E12/L (ref 3.8–5.2)
WBC # BLD AUTO: 7.7 10E9/L (ref 4–11)

## 2017-08-23 PROCEDURE — 36415 COLL VENOUS BLD VENIPUNCTURE: CPT | Performed by: FAMILY MEDICINE

## 2017-08-23 PROCEDURE — 85027 COMPLETE CBC AUTOMATED: CPT | Performed by: FAMILY MEDICINE

## 2017-08-25 ENCOUNTER — TELEPHONE (OUTPATIENT)
Dept: FAMILY MEDICINE | Facility: OTHER | Age: 72
End: 2017-08-25

## 2017-08-25 NOTE — TELEPHONE ENCOUNTER
Reason for Call:  Request for results:    Name of test or procedure: lab    Date of test of procedure: 8/23/17    Location of the test or procedure: [facility]    OK to leave the result message on voice mail or with a family member? YES    Phone number Patient can be reached at:      Additional comments: Please call pt Monday with results.    Call taken on 8/25/2017 at 2:04 PM by Meme Montero

## 2017-09-01 ENCOUNTER — SURGERY (OUTPATIENT)
Age: 72
End: 2017-09-01

## 2017-09-01 ENCOUNTER — HOSPITAL ENCOUNTER (OUTPATIENT)
Facility: CLINIC | Age: 72
Discharge: HOME | End: 2017-09-01
Attending: INTERNAL MEDICINE | Admitting: INTERNAL MEDICINE
Payer: MEDICARE

## 2017-09-01 VITALS
RESPIRATION RATE: 16 BRPM | OXYGEN SATURATION: 98 % | SYSTOLIC BLOOD PRESSURE: 123 MMHG | TEMPERATURE: 97.8 F | DIASTOLIC BLOOD PRESSURE: 77 MMHG

## 2017-09-01 LAB — UPPER GI ENDOSCOPY: NORMAL

## 2017-09-01 PROCEDURE — 43235 EGD DIAGNOSTIC BRUSH WASH: CPT | Performed by: INTERNAL MEDICINE

## 2017-09-01 PROCEDURE — 25000128 H RX IP 250 OP 636: Performed by: INTERNAL MEDICINE

## 2017-09-01 PROCEDURE — 40000296 ZZH STATISTIC ENDO RECOVERY CLASS 1:2 FIRST HOUR: Performed by: INTERNAL MEDICINE

## 2017-09-01 PROCEDURE — 25000125 ZZHC RX 250: Performed by: INTERNAL MEDICINE

## 2017-09-01 RX ORDER — LIDOCAINE 40 MG/G
CREAM TOPICAL
Status: DISCONTINUED | OUTPATIENT
Start: 2017-09-01 | End: 2017-09-01 | Stop reason: HOSPADM

## 2017-09-01 RX ORDER — ONDANSETRON 2 MG/ML
4 INJECTION INTRAMUSCULAR; INTRAVENOUS
Status: DISCONTINUED | OUTPATIENT
Start: 2017-09-01 | End: 2017-09-01 | Stop reason: HOSPADM

## 2017-09-01 RX ORDER — FENTANYL CITRATE 50 UG/ML
INJECTION, SOLUTION INTRAMUSCULAR; INTRAVENOUS PRN
Status: DISCONTINUED | OUTPATIENT
Start: 2017-09-01 | End: 2017-09-01 | Stop reason: HOSPADM

## 2017-09-01 RX ADMIN — MIDAZOLAM HYDROCHLORIDE 1 MG: 1 INJECTION, SOLUTION INTRAMUSCULAR; INTRAVENOUS at 09:23

## 2017-09-01 RX ADMIN — MIDAZOLAM HYDROCHLORIDE 1 MG: 1 INJECTION, SOLUTION INTRAMUSCULAR; INTRAVENOUS at 09:20

## 2017-09-01 RX ADMIN — FENTANYL CITRATE 25 MCG: 50 INJECTION, SOLUTION INTRAMUSCULAR; INTRAVENOUS at 09:17

## 2017-09-01 RX ADMIN — MIDAZOLAM HYDROCHLORIDE 1 MG: 1 INJECTION, SOLUTION INTRAMUSCULAR; INTRAVENOUS at 09:18

## 2017-09-01 RX ADMIN — MIDAZOLAM HYDROCHLORIDE 2 MG: 1 INJECTION, SOLUTION INTRAMUSCULAR; INTRAVENOUS at 09:17

## 2017-09-01 RX ADMIN — LIDOCAINE HYDROCHLORIDE 5 ML: 20 SOLUTION ORAL; TOPICAL at 09:16

## 2017-09-01 RX ADMIN — LIDOCAINE HYDROCHLORIDE 1 ML: 10 INJECTION, SOLUTION EPIDURAL; INFILTRATION; INTRACAUDAL; PERINEURAL at 08:29

## 2017-09-01 NOTE — H&P
[facility] GI Pre-Procedure Physical Assessment    Teagan Strickland MRN# [id number]   Age: 71 year old YOB: 1945      Date of Surgery: 9/1/2017  Location [facility]      Date of Exam 9/1/2017 Facility (Same day)       Home clinic: [facility]  Primary care provider: Gera Espinoza         Active problem list:   Patient Active Problem List   Diagnosis     Seasonal allergic rhinitis     CKD (chronic kidney disease) stage 2, GFR 60-89 ml/min     Gastric ulcer with hemorrhage     Anemia due to blood loss, acute     CARDIOVASCULAR SCREENING; LDL GOAL LESS THAN 130            Medications (include herbals and vitamins):   Any Plavix use in the last 7 days?  No     Current Facility-Administered Medications   Medication     lidocaine 1 % 1 mL     lidocaine (LMX4) kit     sodium chloride (PF) 0.9% PF flush 3 mL     sodium chloride (PF) 0.9% PF flush 3 mL     sodium chloride (PF) 0.9% PF flush 3 mL     ondansetron (ZOFRAN) injection 4 mg             Allergies:      Allergies   Allergen Reactions     No Known Drug Allergies      Allergy to Latex?  No  Allergy to tape?    No          Social History:     Social History   Substance Use Topics     Smoking status: Former Smoker     Packs/day: 1.00     Years: 10.00     Quit date: 1/1/1992     Smokeless tobacco: Never Used     Alcohol use Yes      Comment: social            Physical Exam:   All vitals have been reviewed  Blood pressure 136/67, temperature 97.8  F (36.6  C), temperature source Oral, resp. rate 16, last menstrual period 01/01/1995, SpO2 100 %.  Airway assessment:   Patient is able to open mouth wide  Patient is able to stick out tongue      Lungs:   No increased work of breathing, good air exchange, clear to auscultation bilaterally, no crackles or wheezing      Cardiovascular:   Normal apical impulse, regular rate and rhythm, normal S1 and S2, no S3 or S4, and no murmur noted           Lab / Radiology Results:    All laboratory data reviewed          Assessment:   Appropriately NPO  Chief complaint or anatomic assessment of involved area: check ulcer healing         Plan:   Moderate (conscious) sedation     Patient's active problems diagnostically and therapeutically optimized for the planned procedure  Risks, benefits, alternatives to sedation and blood explained and consent obtained  Risks, benefits, alternatives to procedure explained and consent obtained  P2 (patient with mild systemic disease)  Orders and progress notes are in the chart  Discharge from Phase 1 and / or Phase 2 recovery when patient meets criteria    I have reviewed the history and physical, lab finding(s), diagnostic data, medicaitons, and the plan for sedation.  I have determined this patient to be an appropriate candidate for the planned sedation / procedure and have reassessed the patient immediately prior to sedation / procedure.    I have personally and medically directed the administration of medications used.    Mike Reynoso MD

## 2017-10-11 ENCOUNTER — OFFICE VISIT (OUTPATIENT)
Dept: FAMILY MEDICINE | Facility: OTHER | Age: 72
End: 2017-10-11
Payer: COMMERCIAL

## 2017-10-11 VITALS
SYSTOLIC BLOOD PRESSURE: 118 MMHG | BODY MASS INDEX: 25.37 KG/M2 | TEMPERATURE: 96.3 F | WEIGHT: 137.6 LBS | RESPIRATION RATE: 16 BRPM | OXYGEN SATURATION: 100 % | DIASTOLIC BLOOD PRESSURE: 66 MMHG | HEART RATE: 76 BPM

## 2017-10-11 DIAGNOSIS — Z02.89 HEALTH EXAMINATION OF DEFINED SUBPOPULATION: Primary | ICD-10-CM

## 2017-10-11 DIAGNOSIS — K25.4 GASTROINTESTINAL HEMORRHAGE ASSOCIATED WITH GASTRIC ULCER: ICD-10-CM

## 2017-10-11 LAB
ERYTHROCYTE [DISTWIDTH] IN BLOOD BY AUTOMATED COUNT: 17.1 % (ref 10–15)
HCT VFR BLD AUTO: 30.1 % (ref 35–47)
HGB BLD-MCNC: 9.5 G/DL (ref 11.7–15.7)
MCH RBC QN AUTO: 24.1 PG (ref 26.5–33)
MCHC RBC AUTO-ENTMCNC: 31.6 G/DL (ref 31.5–36.5)
MCV RBC AUTO: 76 FL (ref 78–100)
PLATELET # BLD AUTO: 381 10E9/L (ref 150–450)
RBC # BLD AUTO: 3.95 10E12/L (ref 3.8–5.2)
WBC # BLD AUTO: 7.8 10E9/L (ref 4–11)

## 2017-10-11 PROCEDURE — 99213 OFFICE O/P EST LOW 20 MIN: CPT | Performed by: FAMILY MEDICINE

## 2017-10-11 PROCEDURE — 85027 COMPLETE CBC AUTOMATED: CPT | Performed by: FAMILY MEDICINE

## 2017-10-11 PROCEDURE — 36415 COLL VENOUS BLD VENIPUNCTURE: CPT | Performed by: FAMILY MEDICINE

## 2017-10-11 RX ORDER — PANTOPRAZOLE SODIUM 40 MG/1
40 TABLET, DELAYED RELEASE ORAL DAILY
Qty: 60 TABLET | Refills: 3 | Status: SHIPPED | OUTPATIENT
Start: 2017-10-11

## 2017-10-11 ASSESSMENT — PAIN SCALES - GENERAL: PAINLEVEL: NO PAIN (0)

## 2017-10-11 NOTE — LETTER
October 12, 2017      Teagan Strickland  [id number] [address] [address]        Dear ,    We are writing to inform you of your test results.    Your hemoglobin has improved, please recheck in 1 month.    Resulted Orders   CBC with platelets   Result Value Ref Range    WBC 7.8 4.0 - 11.0 10e9/L    RBC Count 3.95 3.8 - 5.2 10e12/L    Hemoglobin 9.5 (L) 11.7 - 15.7 g/dL    Hematocrit 30.1 (L) 35.0 - 47.0 %    MCV 76 (L) 78 - 100 fl    MCH 24.1 (L) 26.5 - 33.0 pg    MCHC 31.6 31.5 - 36.5 g/dL    RDW 17.1 (H) 10.0 - 15.0 %    Platelet Count 381 150 - 450 10e9/L       If you have any questions or concerns, please call the clinic at the number listed above.       Sincerely,        Gera Espinoza MD, MD

## 2017-10-11 NOTE — PROGRESS NOTES
SUBJECTIVE:                                                    Teagan Strickland is a 71 year old female who presents to clinic today for the following health issues:    Concern - hemoglobin  Onset: recheck    Description:   Recheck hemoglobin    Intensity: 0/10    Progression of Symptoms:  improving    Accompanying Signs & Symptoms:  Up set stomach    Previous history of similar problem:   no    Precipitating factors:   Worsened by: nothing    Alleviating factors:  Improved by: Aleo Vera    Therapies Tried and outcome: Aleo vera; good relief      Problem list and histories reviewed & adjusted, as indicated.    C: NEGATIVE for fever, chills, change in weight  E/M: NEGATIVE for ear, mouth and throat problems  R: NEGATIVE for significant cough or SOB  CV: NEGATIVE for chest pain, palpitations or peripheral edema    OBJECTIVE:                                                    /66 (BP Location: Right arm, Patient Position: Chair, Cuff Size: Adult Large)  Pulse 76  Temp 96.3  F (35.7  C) (Oral)  Resp 16  Wt 137 lb 9.6 oz (62.4 kg)  LMP 01/01/1995  SpO2 100%  BMI 25.37 kg/m2  Body mass index is 25.37 kg/(m^2).         ASSESSMENT/PLAN:                                                    Follow up on  Blood loss anemia    Gera Espinoza MD, MD  [facility]

## 2017-10-11 NOTE — NURSING NOTE
"Chief Complaint   Patient presents with     RECHECK     Hemoglobin       Initial /66 (BP Location: Right arm, Patient Position: Chair, Cuff Size: Adult Large)  Pulse 76  Temp 96.3  F (35.7  C) (Oral)  Resp 16  Wt 137 lb 9.6 oz (62.4 kg)  LMP 01/01/1995  SpO2 100%  BMI 25.37 kg/m2 Estimated body mass index is 25.37 kg/(m^2) as calculated from the following:    Height as of 7/18/17: 5' 1.75\" (1.568 m).    Weight as of this encounter: 137 lb 9.6 oz (62.4 kg).  Medication Reconciliation: complete       Rizwana BETANCOURT LPN      "

## 2017-10-11 NOTE — MR AVS SNAPSHOT
"              After Visit Summary   10/11/2017    Teagan Strickland    MRN: [id number]           Patient Information     Date Of Birth          1945        Visit Information        Provider Department      10/11/2017 10:45 AM Gera Espinoza MD [facility]        Today's Diagnoses     Health examination of defined subpopulation    -  1    Gastrointestinal hemorrhage associated with gastric ulcer           Follow-ups after your visit        Future tests that were ordered for you today     Open Future Orders        Priority Expected Expires Ordered    H Pylori antigen, stool Routine  11/10/2017 10/11/2017    **Hepatitis C Screen Reflex to RNA FUTURE anytime Routine 10/11/2017 10/11/2018 10/11/2017            Who to contact     If you have questions or need follow up information about today's clinic visit or your schedule please contact [facility] directly at [phone].  Normal or non-critical lab and imaging results will be communicated to you by [employer], letter or phone within 4 business days after the clinic has received the results. If you do not hear from us within 7 days, please contact the clinic through [employer] or phone. If you have a critical or abnormal lab result, we will notify you by phone as soon as possible.  Submit refill requests through [employer] or call your pharmacy and they will forward the refill request to us. Please allow 3 business days for your refill to be completed.          Additional Information About Your Visit        MyChart Information     [employer] lets you send messages to your doctor, view your test results, renew your prescriptions, schedule appointments and more. To sign up, go to [email or web address] . Click on \"Log in\" on the left side of the screen, which will take you to the Welcome page. Then click on \"Sign up Now\" on the right side of the page.     You will be asked to enter the access code listed below, as well as some personal " information. Please follow the directions to create your username and password.     Your access code is: [email or web address]  Expires: 2018 12:51 PM     Your access code will  in 90 days. If you need help or a new code, please call your [place] clinic or [phone].        Care EveryWhere ID     This is your Care EveryWhere ID. This could be used by other organizations to access your [place] medical records  [id number]        Your Vitals Were     Pulse Temperature Respirations Last Period Pulse Oximetry BMI (Body Mass Index)    76 96.3  F (35.7  C) (Oral) 16 1995 100% 25.37 kg/m2       Blood Pressure from Last 3 Encounters:   10/11/17 118/66   17 123/77   17 113/64    Weight from Last 3 Encounters:   10/11/17 137 lb 9.6 oz (62.4 kg)   17 141 lb (64 kg)   07/10/17 144 lb 6.4 oz (65.5 kg)              We Performed the Following     CBC with platelets          Where to get your medicines      These medications were sent to [id number] - [place], [place] - [address] [place] [address]  [address] [place] [address]    Hours:  M-F 8:30-6:30; Sat 9-4; closed  Phone:  [phone]     pantoprazole 40 MG EC tablet          Primary Care Provider Office Phone # Fax #    Gera CHARITO Espinoza [phone]       [address] [facility] [address]        Equal Access to Services     [facility]: Hadii mando hobbso Soisabel, waaxda luqadaha, qaybta kaalmada leonardo santiago . So [facility] [phone].    ATENCIÓN: Si habla español, tiene a goetz disposición servicios gratuitos de asistencia lingüística. Llame al [phone].    We comply with applicable federal civil rights laws and [place] laws. We do not discriminate on the basis of race, color, national origin, age, disability, sex, sexual orientation, or gender identity.            Thank you!     Thank you for choosing [facility]  for your care. Our goal is always to provide you  with excellent care. Hearing back from our patients is one way we can continue to improve our services. Please take a few minutes to complete the written survey that you may receive in the mail after your visit with us. Thank you!             Your Updated Medication List - Protect others around you: Learn how to safely use, store and throw away your medicines at www.disposemymeds.org.          This list is accurate as of: 10/11/17 12:51 PM.  Always use your most recent med list.                   Brand Name Dispense Instructions for use Diagnosis    ENZYME DIGEST PO      1 tablet daily        multivitamin per tablet     0    ONE DAILY    OTC -PATIENT CHOICE       pantoprazole 40 MG EC tablet    PROTONIX    60 tablet    Take 1 tablet (40 mg) by mouth daily Take 30-60 minutes before a meal.    Gastrointestinal hemorrhage associated with gastric ulcer       PROBIOTIC DAILY PO      daily        vitamin D 1000 UNITS capsule      Take 2 capsules by mouth daily.

## 2017-10-12 DIAGNOSIS — K25.4 GASTROINTESTINAL HEMORRHAGE ASSOCIATED WITH GASTRIC ULCER: ICD-10-CM

## 2017-10-12 PROCEDURE — 87338 HPYLORI STOOL AG IA: CPT | Performed by: FAMILY MEDICINE

## 2017-10-12 NOTE — PROGRESS NOTES
REASON FOR VISIT:  Teagan Strickland is a 71-year-old woman in for follow-up of her gastrointestinal bleed from the gastric ulcer noted on scoping.  She did not require transfusion.  Hemoglobin has stabilized, and she is in for a recheck.      SUBJECTIVE:  She has not noticed any further dark stools and does not have any health complaints.  She has discontinued NSAIDs and aspirin.        DATA:  She recently underwent a repeat scoping, and fortunately no bleeding or ulcer was identified.  She does have the clip in from when it had been clipped the last time.      Hemoglobin today remains low, but slightly improved at 9.5, having been 9.2 several weeks ago.      PLAN:  Her gastroenterologist had suggested we check for H. pylori, and we will draw blood for that today.         DEVANTE BENNETT M.D.             D: 10/11/2017 12:50   T: 10/12/2017 05:24   MT: ANGÉLICA#[id number]      Name:     TEAGAN STRICKLAND   MRN:      [id number]        Account:      [id number]   :      1945           Visit Date:   10/11/2017      Document: [id number]       cc: Mike Reynoso MD

## 2017-10-13 LAB
H PYLORI AG STL QL IA: NORMAL
SPECIMEN SOURCE: NORMAL

## 2018-02-21 ENCOUNTER — TELEPHONE (OUTPATIENT)
Dept: FAMILY MEDICINE | Facility: OTHER | Age: 73
End: 2018-02-21

## 2018-02-21 NOTE — TELEPHONE ENCOUNTER
Reason for Call:  Other     Detailed comments: she is having a dental procedure tomorrow and the dentist is wondering if she needs a premed. She was a [facility] patient and hasn't established care with another provider     Phone Number Patient can be reached at:     Best Time: any    Can we leave a detailed message on this number? YES    Call taken on 2/21/2018 at 10:25 AM by Kennedy Dick

## 2018-02-21 NOTE — TELEPHONE ENCOUNTER
Teagan returns call and states that in the past, approx 10yrs ago, she was told that she should have a antibiotic prior to procedures due to her past history of Rheumatic fever and a heat murmur many years ago.  Last procedure she had, Colonoscopy, Dr Espinoza didn't think she needed anything.  So she is just checking to see if she would need something for this dental procedure.    Teagan also states this past summer she had a bleeding ulcer.

## 2018-02-21 NOTE — TELEPHONE ENCOUNTER
Pt notifed. Patient voiced understanding.  ................Jayjay De La Cruz LPN,   February 21, 2018,      4:52 PM,   [facility]

## 2018-02-21 NOTE — TELEPHONE ENCOUNTER
Is there a specific reason they think she needs antibiotics?  I don't see anything in her chart that would indicate that, but I have never seen her.     Electronically signed by Ethel Jaime CNP.

## 2018-02-21 NOTE — TELEPHONE ENCOUNTER
I have attempted to contact this patient by phone with the following results: left message to return my call on answering machine.  Jenna Ortega MA     2/21/2018

## 2018-07-02 ENCOUNTER — ALLIED HEALTH/NURSE VISIT (OUTPATIENT)
Dept: FAMILY MEDICINE | Facility: OTHER | Age: 73
End: 2018-07-02
Payer: COMMERCIAL

## 2018-07-02 DIAGNOSIS — Z23 NEED FOR VACCINATION: Primary | ICD-10-CM

## 2018-07-02 PROCEDURE — 90471 IMMUNIZATION ADMIN: CPT

## 2018-07-02 PROCEDURE — 90750 HZV VACC RECOMBINANT IM: CPT

## 2018-07-02 NOTE — NURSING NOTE
Screening Questionnaire for Adult Immunization    Are you sick today?   No   Do you have allergies to medications, food, a vaccine component or latex?   No   Have you ever had a serious reaction after receiving a vaccination?   No   Do you have a long-term health problem with heart disease, lung disease, asthma, kidney disease, metabolic disease (e.g. diabetes), anemia, or other blood disorder?   No   Do you have cancer, leukemia, HIV/AIDS, or any other immune system problem?   No   In the past 3 months, have you taken medications that affect  your immune system, such as prednisone, other steroids, or anticancer drugs; drugs for the treatment of rheumatoid arthritis, Crohn s disease, or psoriasis; or have you had radiation treatments?   No   Have you had a seizure, or a brain or other nervous system problem?   No   During the past year, have you received a transfusion of blood or blood     products, or been given immune (gamma) globulin or antiviral drug?   No   For women: Are you pregnant or is there a chance you could become        pregnant during the next month?   No   Have you received any vaccinations in the past 4 weeks?   No     Immunization questionnaire answers were all negative.      Prior to injection verified patient identity using patient's name and date of birth.  Due to injection administration, patient instructed to remain in clinic for 15 minutes  afterwards, and to report any adverse reaction to me immediately.         Screening performed by Jenna Ortega on 7/2/2018 at 2:58 PM.    Patient stated that she contacted her insurance to be sure that this vaccine is covered. I informed her that sometimes insurance does not cover this vaccine in clinic and that she would be responsible for the bill. She expressed understanding and wanting to continue with the vaccine.  Jenna Spear MA     7/2/2018

## 2018-07-02 NOTE — MR AVS SNAPSHOT
After Visit Summary   7/2/2018    Teagan Strickland    MRN: [id number]           Patient Information     Date Of Birth          1945        Visit Information        Provider Department      7/2/2018 11:00 AM JINNY BRYANT MA [facility]        Today's Diagnoses     Need for vaccination    -  1       Follow-ups after your visit        Who to contact     If you have questions or need follow up information about today's clinic visit or your schedule please contact [facility] directly at [phone].  Normal or non-critical lab and imaging results will be communicated to you by MyChart, letter or phone within 4 business days after the clinic has received the results. If you do not hear from us within 7 days, please contact the clinic through MyChart or phone. If you have a critical or abnormal lab result, we will notify you by phone as soon as possible.  Submit refill requests through [employer] or call your pharmacy and they will forward the refill request to us. Please allow 3 business days for your refill to be completed.          Additional Information About Your Visit        Care EveryWhere ID     This is your Care EveryWhere ID. This could be used by other organizations to access your [facility] medical records  [id number]        Your Vitals Were     Last Period                   01/01/1995            Blood Pressure from Last 3 Encounters:   10/11/17 118/66   09/01/17 123/77   07/18/17 113/64    Weight from Last 3 Encounters:   10/11/17 137 lb 9.6 oz (62.4 kg)   07/18/17 141 lb (64 kg)   07/10/17 144 lb 6.4 oz (65.5 kg)              We Performed the Following     1st  Administration  [42540]     SHINGRIX [[id number]]        Primary Care Provider Fax #    Physician No Ref-Primary [phone]       No address on file        Equal Access to Services     NAKUL PITTMAN : Chris Ascencio, vanesa harris, leonardo robert .  So [facility] [phone].    ATENCIÓN: Si habla sahara, tiene a goetz disposición servicios gratuitos de asistencia lingüística. Chip harp [phone].    We comply with applicable federal civil rights laws and [place] laws. We do not discriminate on the basis of race, color, national origin, age, disability, sex, sexual orientation, or gender identity.            Thank you!     Thank you for choosing [facility]  for your care. Our goal is always to provide you with excellent care. Hearing back from our patients is one way we can continue to improve our services. Please take a few minutes to complete the written survey that you may receive in the mail after your visit with us. Thank you!             Your Updated Medication List - Protect others around you: Learn how to safely use, store and throw away your medicines at www.disposemymeds.org.          This list is accurate as of 7/2/18  3:03 PM.  Always use your most recent med list.                   Brand Name Dispense Instructions for use Diagnosis    ENZYME DIGEST PO      1 tablet daily        multivitamin per tablet     0    ONE DAILY    OTC -PATIENT CHOICE       pantoprazole 40 MG EC tablet    PROTONIX    60 tablet    Take 1 tablet (40 mg) by mouth daily Take 30-60 minutes before a meal.    Gastrointestinal hemorrhage associated with gastric ulcer       PROBIOTIC DAILY PO      daily        vitamin D 1000 units capsule      Take 2 capsules by mouth daily.

## 2018-09-28 ENCOUNTER — ALLIED HEALTH/NURSE VISIT (OUTPATIENT)
Dept: FAMILY MEDICINE | Facility: OTHER | Age: 73
End: 2018-09-28
Payer: COMMERCIAL

## 2018-09-28 DIAGNOSIS — Z23 NEED FOR VACCINATION: Primary | ICD-10-CM

## 2018-09-28 PROCEDURE — 90471 IMMUNIZATION ADMIN: CPT

## 2018-09-28 PROCEDURE — 90750 HZV VACC RECOMBINANT IM: CPT

## 2018-09-28 NOTE — NURSING NOTE
Screening Questionnaire for Adult Immunization    Are you sick today?   No   Do you have allergies to medications, food, a vaccine component or latex?   No   Have you ever had a serious reaction after receiving a vaccination?   No   Do you have a long-term health problem with heart disease, lung disease, asthma, kidney disease, metabolic disease (e.g. diabetes), anemia, or other blood disorder?   No   Do you have cancer, leukemia, HIV/AIDS, or any other immune system problem?   No   In the past 3 months, have you taken medications that affect  your immune system, such as prednisone, other steroids, or anticancer drugs; drugs for the treatment of rheumatoid arthritis, Crohn s disease, or psoriasis; or have you had radiation treatments?   No   Have you had a seizure, or a brain or other nervous system problem?   No   During the past year, have you received a transfusion of blood or blood     products, or been given immune (gamma) globulin or antiviral drug?   No   For women: Are you pregnant or is there a chance you could become        pregnant during the next month?   No   Have you received any vaccinations in the past 4 weeks?   No     Immunization questionnaire answers were all negative.    Prior to injection verified patient identity using patient's name and date of birth.  Due to injection administration, patient instructed to remain in clinic for 15 minutes  afterwards, and to report any adverse reaction to me immediately.    Jenna Spear MA     9/28/2018

## 2018-09-28 NOTE — MR AVS SNAPSHOT
After Visit Summary   9/28/2018    Teagan Strickland    MRN: [id number]           Patient Information     Date Of Birth          1945        Visit Information        Provider Department      9/28/2018 10:45 AM JINNY BRYANT MA [facility]        Today's Diagnoses     Need for vaccination    -  1       Follow-ups after your visit        Who to contact     If you have questions or need follow up information about today's clinic visit or your schedule please contact [facility] directly at [phone].  Normal or non-critical lab and imaging results will be communicated to you by MyChart, letter or phone within 4 business days after the clinic has received the results. If you do not hear from us within 7 days, please contact the clinic through MyChart or phone. If you have a critical or abnormal lab result, we will notify you by phone as soon as possible.  Submit refill requests through [email or web address] or call your pharmacy and they will forward the refill request to us. Please allow 3 business days for your refill to be completed.          Additional Information About Your Visit        Care EveryWhere ID     This is your Care EveryWhere ID. This could be used by other organizations to access your [place] medical records  [id number]        Your Vitals Were     Last Period                   01/01/1995            Blood Pressure from Last 3 Encounters:   10/11/17 118/66   09/01/17 123/77   07/18/17 113/64    Weight from Last 3 Encounters:   10/11/17 137 lb 9.6 oz (62.4 kg)   07/18/17 141 lb (64 kg)   07/10/17 144 lb 6.4 oz (65.5 kg)              We Performed the Following     1st  Administration  [18111]     SHINGRIX [[id number]]        Primary Care Provider Fax #    Physician No Ref-Primary [phone]       No address on file        Equal Access to Services     NAKUL PITTAMN AH: Chris Ascencio, vanesa harris, leonardo robert  ah. So [facility] [phone].    ATENCIÓN: Si habla sahara, tiene a goetz disposición servicios gratuitos de asistencia lingüística. Chip al [phone].    We comply with applicable federal civil rights laws and [place] laws. We do not discriminate on the basis of race, color, national origin, age, disability, sex, sexual orientation, or gender identity.            Thank you!     Thank you for choosing [facility]  for your care. Our goal is always to provide you with excellent care. Hearing back from our patients is one way we can continue to improve our services. Please take a few minutes to complete the written survey that you may receive in the mail after your visit with us. Thank you!             Your Updated Medication List - Protect others around you: Learn how to safely use, store and throw away your medicines at www.disposemymeds.org.          This list is accurate as of 9/28/18  3:31 PM.  Always use your most recent med list.                   Brand Name Dispense Instructions for use Diagnosis    ENZYME DIGEST PO      1 tablet daily        multivitamin per tablet     0    ONE DAILY    OTC -PATIENT CHOICE       pantoprazole 40 MG EC tablet    PROTONIX    60 tablet    Take 1 tablet (40 mg) by mouth daily Take 30-60 minutes before a meal.    Gastrointestinal hemorrhage associated with gastric ulcer       PROBIOTIC DAILY PO      daily        vitamin D 1000 units capsule      Take 2 capsules by mouth daily.

## 2019-03-20 ENCOUNTER — OFFICE VISIT (OUTPATIENT)
Dept: FAMILY MEDICINE | Facility: OTHER | Age: 74
End: 2019-03-20
Payer: COMMERCIAL

## 2019-03-20 VITALS
SYSTOLIC BLOOD PRESSURE: 130 MMHG | TEMPERATURE: 100.8 F | HEART RATE: 90 BPM | BODY MASS INDEX: 24.66 KG/M2 | RESPIRATION RATE: 18 BRPM | DIASTOLIC BLOOD PRESSURE: 68 MMHG | OXYGEN SATURATION: 99 % | WEIGHT: 134 LBS | HEIGHT: 62 IN

## 2019-03-20 DIAGNOSIS — J01.00 ACUTE NON-RECURRENT MAXILLARY SINUSITIS: Primary | ICD-10-CM

## 2019-03-20 PROCEDURE — 99213 OFFICE O/P EST LOW 20 MIN: CPT | Performed by: INTERNAL MEDICINE

## 2019-03-20 RX ORDER — AZITHROMYCIN 250 MG/1
TABLET, FILM COATED ORAL
Qty: 6 TABLET | Refills: 0 | Status: SHIPPED | OUTPATIENT
Start: 2019-03-20 | End: 2019-03-25

## 2019-03-20 ASSESSMENT — MIFFLIN-ST. JEOR: SCORE: 1066.07

## 2019-03-20 ASSESSMENT — PAIN SCALES - GENERAL: PAINLEVEL: NO PAIN (0)

## 2019-03-20 NOTE — PROGRESS NOTES
SUBJECTIVE:   Teagan Strickland is a 73 year old female who presents to clinic today for the following health issues:      Acute Illness   Acute illness concerns: cold/sinus  Onset: 3 days    Fever: YES- low grade    Chills/Sweats: YES    Headache (location?): YES    Sinus Pressure:no    Conjunctivitis:  no    Ear Pain: no    Rhinorrhea: YES    Congestion: YES    Sore Throat: YES- yesterday     Cough: YES-productive of clear sputum    Wheeze: no    Decreased Appetite: YES    Nausea: YES    Vomiting: no    Diarrhea:  no    Dysuria/Freq.: no    Fatigue/Achiness: YES    Sick/Strep Exposure: no     Therapies Tried and outcome: musinex and over the counter sinus medications- helped come                      Chief Complaint         The patient is a pleasant 73-year-old female who has not seen a physician in some time.  She presents today with low-grade fever, sinus pressure, posterior nasal drainage, runny nose, and a slight sore throat.  She does have a clear to slightly yellow sputum and has had increased fatigue.  She is taking food and fluid adequately at this time.  She has a coexistent history of some mild reflux symptoms but is no longer requiring the proton pump inhibitor.                         PAST, FAMILY,SOCIAL HISTORY:     Medical  History:   has a past medical history of NO ACTIVE PROBLEMS.     Surgical History:   has a past surgical history that includes DILATION/CURETTAGE DIAG/THER NON OB; REMOVAL OF TONSILS,<11 Y/O; COLONOSCOPY W BIOPSY (12/19/07); Colonoscopy (3/7/2014); and Esophagoscopy, gastroscopy, duodenoscopy (EGD), combined (N/A, 9/1/2017).     Social History:   reports that she quit smoking about 27 years ago. She has a 10.00 pack-year smoking history. she has never used smokeless tobacco. She reports that she drinks alcohol. She reports that she does not use drugs.     Family History:  family history includes Arthritis in her paternal grandmother; Cancer in her maternal grandfather; Diabetes in  "her brother, brother, maternal aunt, maternal grandmother, maternal uncle, and mother; EYE* in her mother; Osteoporosis in her mother; Respiratory in her father; Thyroid Disease in her mother.            MEDICATIONS  Current Outpatient Medications   Medication Sig Dispense Refill     azithromycin (ZITHROMAX) 250 MG tablet Take 2 tablets (500 mg) by mouth daily for 1 day, THEN 1 tablet (250 mg) daily for 4 days. 6 tablet 0     Cholecalciferol (VITAMIN D) 1000 UNIT capsule Take 2 capsules by mouth daily.       Coenzyme Q10 (COQ-10 PO)        Digestive Enzymes (ENZYME DIGEST PO) 1 tablet daily       MULTIVITAMINS OR TABS ONE DAILY 0 0     Omega-3 Fatty Acids (OMEGA 3 PO)        Probiotic Product (PROBIOTIC DAILY PO) daily       VITAMIN K PO        pantoprazole (PROTONIX) 40 MG EC tablet Take 1 tablet (40 mg) by mouth daily Take 30-60 minutes before a meal. (Patient not taking: Reported on 3/20/2019) 60 tablet 3         --------------------------------------------------------------------------------------------------------------------                              Review of Systems       LUNGS: Pt denies: cough, excess sputum, hemoptysis, or shortness of breath.   HEART: Pt denies: chest pain, arrhythmia, syncope, tachy or bradyarrhythmia.   GI: Pt denies: nausea, vomiting, diarrhea, constipation, melena, or hematochezia.   NEURO: Pt denies: seizures, strokes, diplopia, weakness, paraesthesias, or paralysis.   SKIN: Pt denies: itching, rashes, discoloration, or specific lesions of concern. Denies recent hair loss.   PSYCH: The patient denies significant depression, anxiety, mood imbalance. Specifically denies any suicidal ideation.                                     Examination      /68 (BP Location: Right arm, Patient Position: Sitting, Cuff Size: Adult Regular)   Pulse 90   Temp 100.8  F (38.2  C) (Temporal)   Resp 18   Ht 1.575 m (5' 2\")   Wt 60.8 kg (134 lb)   LMP 01/01/1995   SpO2 99%   BMI 24.51 " kg/m     Constitutional: The patient appears to be in modest acute distress. The patient appears to be adequately hydrated. No acute respiratory or hemodynamic distress is noted at this time.   LUNGS: clear bilaterally, airflow is brisk, no intercostal retraction or stridor is noted. No coughing is noted during visit.   HEART:  regular without rubs, clicks, gallops, or murmurs. PMI is nondisplaced. Upstrokes are brisk. S1,S2 are heard.   GI: Abdomen is soft, without rebound, guarding or tenderness. Bowel sounds are appropriate. No renal bruits are heard.   NEURO: Pt is alert and appropriate. No neurologic lateralization is noted. Cranial nerves 2-12 are intact. Peripheral sensory and motor function are grossly normal.    SKIN:  warm and dry. No erythema, or rashes are noted. No specific lesions of concern are noted.    PSYCH: The patient appears grossly appropriate. Maintains good eye contact, does not have any jittery or atypical motion. Displays appropriate affect.   ENT: Pharynx is mildly-erythemous, moderate/yellow PND, there is significant nasal obstruction, TM's are slightly red and Retracted, hearing intact bilaterally. No carotid bruits are heard. No JVD seen. Thyroid is not nodular or enlarged.  Maxillary tap is positive bilaterally.                                           Decision Making  1. Acute non-recurrent maxillary sinusitis  Tylenol, fluids, rest,  - azithromycin (ZITHROMAX) 250 MG tablet; Take 2 tablets (500 mg) by mouth daily for 1 day, THEN 1 tablet (250 mg) daily for 4 days.  Dispense: 6 tablet; Refill: 0                           FOLLOW UP   I have asked the patient to make an appointment for followup with me as needed.  We have discussed the need for routine health maintenance and her multiple parameters that are currently overdue.  She will take this into consideration when she is feeling better.  At this time, she has no intentions of committing        I have carefully explained the  diagnosis and treatment options to the patient.  The patient has displayed an understanding of the above, and all subsequent questions were answered.      DO FRANCES Addison    Portions of this note were produced using [employer]  Although every attempt at real-time proof reading has been made, occasional grammar/syntax errors may have been missed.

## 2019-07-11 ENCOUNTER — TELEPHONE (OUTPATIENT)
Dept: FAMILY MEDICINE | Facility: OTHER | Age: 74
End: 2019-07-11

## 2019-07-11 NOTE — TELEPHONE ENCOUNTER
Panel Management Review      Patient has the following on her problem list:       Composite cancer screening  Chart review shows that this patient is due/due soon for the following   Summary:    Patient is due/failing the following:   COLONOSCOPY, LDL and MAMMOGRAM    Action needed:   Patient needs office visit for annual wellness exam, colonoscopy and mammo.    Type of outreach:    Phone, left message for patient to call back.  and Sent letter.    Questions for provider review:    None                                                                                                                                    Jenna Spear MA     7/11/2019

## 2019-07-11 NOTE — LETTER
[facility]  [address] [facility] [id number] [phone]        Teagan Strickland  [address]  [facility] [address]      July 11, 2019      Dear Teagan,    I care about your health and have reviewed your health plan, including your medical conditions, medication list, and lab results and am making recommendations based on this review, to better manage your health.    You are in particular need of attention regarding:  -Breast Cancer Screening  -Colon Cancer Screening  -Wellness (Physical) Visit     I am recommending that you:  -schedule a WELLNESS (Physical) APPOINTMENT with me.   I will check fasting labs the same day - nothing to eat except water and meds for 8-10 hours prior. (If you go elsewhere for Wellness visits then please disregard this reminder.)  -schedule a MAMMOGRAM which is due. You can call  [phone] to schedule your mammogram.    -schedule a COLONOSCOPY.  Colon cancer is now the second leading cause of cancer-related deaths in the [place] for both men and women.  There are over 130,000 new cases and 50,000 deaths per year from colon cancer.  A recent study, which included patients ages 55 to 79 found 50,400 American deaths from colorectal cancer could have been prevented if patients had undergone a colonoscopy in the previous 10 years.    If you have not had a colonoscopy, we encourage you to schedule by contacting us at [phone], Monday through Friday.  After hours, you may leave a message and we will return your call during normal business hours.      There is another option called a FIT test, if you don t wish to have a colonoscopy, which needs to be repeated every year.  It does replace the colonoscopy for colorectal cancer screening and can detect hidden bleeding in the lower colon.  If a positive result is obtained, you would be referred for a colonoscopy. Please discuss this option with your provider.      For patients under/uninsured, we  recommend you contact the [employer] program. [employer] is a free colorectal cancer screening program that provides colonoscopies for eligible under/uninsured [place] men and women. If you are interested in receiving a free colonoscopy, please call [facility] at [phone] (mention code [employer]) to see if you re eligible.     If you've had the preventative screening completed at another facility or feel you're not due for this screening, please call our clinic at the number listed above or send us a My Chart message so we can update our records. We would like to thank you in advance for taking the time to take care of your health.  If you have any questions, please don t hesitate to contact our clinic.    Sincerely,       Your [facility] Team

## 2020-11-09 ENCOUNTER — OFFICE VISIT (OUTPATIENT)
Dept: OBGYN | Facility: CLINIC | Age: 75
End: 2020-11-09
Payer: COMMERCIAL

## 2020-11-09 VITALS
DIASTOLIC BLOOD PRESSURE: 76 MMHG | BODY MASS INDEX: 25.04 KG/M2 | SYSTOLIC BLOOD PRESSURE: 130 MMHG | TEMPERATURE: 98.6 F | WEIGHT: 136.9 LBS | HEART RATE: 63 BPM

## 2020-11-09 DIAGNOSIS — N95.2 VAGINAL ATROPHY: Primary | ICD-10-CM

## 2020-11-09 DIAGNOSIS — N81.11 CYSTOCELE, MIDLINE: ICD-10-CM

## 2020-11-09 PROCEDURE — 99203 OFFICE O/P NEW LOW 30 MIN: CPT | Performed by: OBSTETRICS & GYNECOLOGY

## 2020-11-09 RX ORDER — LATANOPROST 50 UG/ML
1 SOLUTION/ DROPS OPHTHALMIC DAILY
COMMUNITY

## 2020-11-09 RX ORDER — ESTRADIOL 0.1 MG/G
1 CREAM VAGINAL
Qty: 42.5 G | Refills: 1 | Status: SHIPPED | OUTPATIENT
Start: 2020-11-09

## 2020-11-09 NOTE — PROGRESS NOTES
SUBJECTIVE:       HPI: Teagan Strickland is a 74 year old   who presents today for consultation for vaginal dryness, possible cyst. Her last pelvic exam was several years ago. Feels as though she has vaginal swelling, but she is not really sure. Feels dryness with intercourse, uses KY with relief but has to reapply often. Possible cyst or swelling noted for the past 3-4 months.    Chronic urinary incontinence, mainly stress with laughter. Wears a panty liner for extra protection, often dry at end of day.    She denies vaginal discharge, postmenopausal bleeding, dyspareunia. She denies fevers/chills, nausea/vomiting, abdominal pain or bloating. Denies dysuria, hematuria, constipation or diarrhea.      Ob Hx:   s/p SVDx2.      Gyn Hx: Patient's last menstrual period was 1995.     Last pap was 2007 NIL, No history of abnormal paps. Next pap due NA>66yo   STI history denies  STD testing offered?  Declined  Menopause: 50-51yo. Has hx oral HRT (Prempro), for about 5 years. Has never used vaginal estrogen  Last Mammogram 2016 birads 1  Colon Screening 3/2014 +diverticulosis  DEXA Screening   Family history of gyn-related malignancies: denies         reports that she quit smoking about 28 years ago. She has a 10.00 pack-year smoking history. She has never used smokeless tobacco.      Today's PHQ-2 Score:   PHQ-2 (  [employer]) 3/20/2019   Q1: Little interest or pleasure in doing things 0   Q2: Feeling down, depressed or hopeless 0   PHQ-2 Score 0     Today's PHQ-9 Score: No flowsheet data found.  Today's PER-7 Score: No flowsheet data found.    Problem list and histories reviewed & adjusted, as indicated.  Additional history: as documented.    Patient Active Problem List   Diagnosis     Seasonal allergic rhinitis     CKD (chronic kidney disease) stage 2, GFR 60-89 ml/min     Gastric ulcer with hemorrhage     Anemia due to blood loss, acute     CARDIOVASCULAR SCREENING; LDL GOAL LESS THAN 130      Past Surgical History:   Procedure Laterality Date     COLONOSCOPY  3/7/2014    Procedure: COLONOSCOPY;  colonoscopy;  Surgeon: Jayme Dillon MD;  Location: [facility]     ESOPHAGOSCOPY, GASTROSCOPY, DUODENOSCOPY (EGD), COMBINED N/A 2017    Procedure: COMBINED ESOPHAGOSCOPY, GASTROSCOPY, DUODENOSCOPY (EGD);  ESOPHAGOSCOPY, GASTROSCOPY, DUODENOSCOPY (EGD);  Surgeon: Miek Reynoso MD;  Location: [facility]     HC COLONOSCOPY W BIOPSY  07     HC DILATION/CURETTAGE DIAG/THER NON OB       HC REMOVAL OF TONSILS,<13 Y/O        Social History     Tobacco Use     Smoking status: Former Smoker     Packs/day: 1.00     Years: 10.00     Pack years: 10.00     Quit date: 1992     Years since quittin.8     Smokeless tobacco: Never Used   Substance Use Topics     Alcohol use: Yes     Comment: social      Problem (# of Occurrences) Relation (Name,Age of Onset)    Arthritis (1) Paternal Grandmother    Cancer (1) Maternal Grandfather: prostate    Diabetes (6) Mother: oral, Maternal Grandmother: insulin, Maternal Aunt: insulin, Maternal Uncle: insulin, Brother: x2  oral meds, Brother    EYE* (1) Mother: cataract    Osteoporosis (1) Mother    Respiratory (1) Father: unknown specific problem--didn't keep contact with family    Thyroid Disease (1) Mother                 Cholecalciferol (VITAMIN D) 1000 UNIT capsule, Take 2 capsules by mouth daily.       Coenzyme Q10 (COQ-10 PO),        Digestive Enzymes (ENZYME DIGEST PO), 1 tablet daily       latanoprost (XALATAN) 0.005 % ophthalmic solution, 1 drop daily       MULTIVITAMINS OR TABS, ONE DAILY       OIL OF OREGANO PO,        Probiotic Product (PROBIOTIC DAILY PO), daily       VITAMIN K PO,        Omega-3 Fatty Acids (OMEGA 3 PO),        pantoprazole (PROTONIX) 40 MG EC tablet, Take 1 tablet (40 mg) by mouth daily Take 30-60 minutes before a meal. (Patient not taking: Reported on 3/20/2019)    No current facility-administered medications on file prior to visit.      Allergies   Allergen Reactions     No Known Drug Allergies        ROS:  10 Point review of systems negative other noted above in HPI    OBJECTIVE:     /76 (BP Location: Right arm, Patient Position: Chair, Cuff Size: Adult Regular)   Pulse 63   Temp 98.6  F (37  C) (Temporal)   Wt 62.1 kg (136 lb 14.4 oz)   LMP 1995   BMI 25.04 kg/m    Body mass index is 25.04 kg/m .      Gen: Alert, oriented, appropriately interactive, NAD  Chest: Symmetrical, unlabored breathing  Abdomen: soft, non tender, non distended, no masses, no hernias. No inguinal lymphadenopathy.   External genitalia: no lesions; normal appearing external genitalia, bartholins glands, urethra, skenes glands. Cystocele present, extends to about 1cm superior of hymenal ring with valsalva.  Vagina: no masses or lesions or discharge, atrophic  Cervix: no masses or lesions or discharge, atrophic  Bimanual exam:   Nontender pelvic floor muscles  Urethra: nontender   Bladder: nontender and without massess, well supported   Uterus: midline, anteverted, small, mobile  no masses, non-tender  Adnexa: no masses or tenderness appreciated   No cervical motion tenderness  MSK: normal gait, symmetric movements UE & LE  Lower extremities: non-tender, no edema      In-Clinic Test Results:  No results found for this or any previous visit (from the past 24 hour(s)).    ASSESSMENT/PLAN:                                                      Teagan Strickland is a 74 year old   who presents today for vaginal dryness, cystocele      ICD-10-CM    1. Vaginal atrophy  N95.2 estradiol (ESTRACE) 0.1 MG/GM vaginal cream   2. Cystocele, midline  N81.11      Vaginal atrophy with dryness during intercourse. Discussed conservative management options, including recommendation for adequate lubrication (KY - non-scented, olive oil, coconut oil, etc), vaginal moisturizer such as Replens +/- vaginal estrogen cream. RBA discussed, Rx estrace sent to pharmacy in event  decides to go forward with this option as well. All questions answered, patient in agreement with plan.    Discussed treatment options for pelvic organ prolapse, including conservative options in form of pelvic PT and/or pessary versus surgical options. Patient desires to continue with expectant management as it does not bother her at this time. Return for worsening symptoms or if desires alternative management.      Maria Victoria Pappas, DO  [facility]

## 2020-11-09 NOTE — PATIENT INSTRUCTIONS
Lubrication: May use Over the counter lubricants (I.E: KY), olive oil or coconut oil with intercourse  MoisturizerPatient Education     Understanding Cystocele (Prolapsed Bladder)  A cystocele is when a woman s bladder sags down into the vagina. It does this when the wall of tissue between the bladder and the vagina gets weak. It s also called a prolapsed bladder. The sagging bladder can stretch the opening of the urethra. This is the tube that carries urine out of the body. This can cause urine to leak when you cough, sneeze, or lift something heavy. A cystocele can also cause discomfort in the pelvis and make it hard to fully empty your bladder.  The risk of cystocele is greater for women who have had vaginal deliveries.  Causes of a cystocele  A cystocele may be caused by:    Heavy lifting    Straining muscles during childbirth    Chronic constipation    Repeated straining during bowel movements or with coughing    Weak muscles around the vagina caused by lack of estrogen after menopause    Obesity    Aging    Previous pelvic surgery  Symptoms of a cystocele  Symptoms of a cystocele include:    Leakage of urine when you cough, sneeze, or lift something heavy    Heavy, achy, or full feeling in the pelvis    Pelvic pressure that gets worse with standing, lifting, or coughing    A bulge in the vagina that you can feel    Lower back pain    Sexual difficulties    Problems with inserting tampons    Frequent urination or the urge to pass urine    Incomplete emptying of the bladder    Trouble starting a stream of urine  Diagnosis of a cystocele  Your healthcare provider will ask about your medical history and give you a physical exam. Your doctor may also look in your bladder with a camera (cystoscopy), bladder function testing (urodynamics), X-rays, ultrasound, and MRI.  A cystocele is graded during diagnosis. Grade 1 means the bladder sags only a short way into the top of the vagina. Grade 2 means the bladder sags  down to the lower opening of the vagina. Grade 3 means the bladder sags out of the lower opening of the vagina.  Treatment of a cystocele  Treatment depends on the grade of your cystocele and other factors. Your choices may include:    Change of activity. You may need to avoid certain activities, such as heavy lifting or straining, that can cause your cystocele to get worse.    Pessary. This is a device put in the vagina to hold the bladder in place.    Surgery. A procedure can be done to move the bladder back into a more normal position and hold it in place.    Estrogen replacement therapy. This may help to strengthen the muscles around the vagina and bladder. Talk with your healthcare provider about the risks and benefits of hormone therapy based on your medical history.  [employer] last reviewed this educational content on 11/1/2017 2000-2020 [employer]. [address] [place] [address]. All rights reserved. This information is not intended as a substitute for professional medical care. Always follow your healthcare professional's instructions.         : May use Over the counter vaginal moisterizer, such as Replens, 2-3 times per week at night. Not to be used during intercourse

## 2023-04-05 ENCOUNTER — TELEPHONE (OUTPATIENT)
Dept: OBGYN | Facility: CLINIC | Age: 78
End: 2023-04-05
Payer: COMMERCIAL

## 2023-04-05 NOTE — TELEPHONE ENCOUNTER
Reason for Call:  Other call back    Detailed comments: pt calling/ last seen Elyse 12/2020 with prolapsed bladder. Pt stating that it has been getting worse as far as leakage, etc. Scheduled in [place] 6/14 - would like to know if she could be worked into [place] any sooner as she has noticed blood as well and this is concerning her. Please call back to discuss with pt     Phone Number Patient can be reached at: Home number on file [phone] (home)    Best Time: any    Can we leave a detailed message on this number? YES    Call taken on 4/5/2023 at 4:23 PM by Whitney De

## 2023-04-06 NOTE — TELEPHONE ENCOUNTER
Maria Victoria Pappas, DO  You 15 hours ago (5:12 PM)     KK  145 on 4/28?      RN called pt and offered appt.    Patient verbalized understanding and agreed to plan.     Abbi López RN on 4/6/2023 at 8:22 AM

## 2023-04-28 ENCOUNTER — OFFICE VISIT (OUTPATIENT)
Dept: OBGYN | Facility: CLINIC | Age: 78
End: 2023-04-28
Payer: COMMERCIAL

## 2023-04-28 VITALS
HEART RATE: 83 BPM | WEIGHT: 137.1 LBS | DIASTOLIC BLOOD PRESSURE: 78 MMHG | SYSTOLIC BLOOD PRESSURE: 120 MMHG | BODY MASS INDEX: 25.08 KG/M2

## 2023-04-28 DIAGNOSIS — Z12.11 SPECIAL SCREENING FOR MALIGNANT NEOPLASMS, COLON: ICD-10-CM

## 2023-04-28 DIAGNOSIS — Z13.820 SCREENING FOR OSTEOPOROSIS: ICD-10-CM

## 2023-04-28 DIAGNOSIS — N95.9 UNSPECIFIED MENOPAUSAL AND PERIMENOPAUSAL DISORDER: ICD-10-CM

## 2023-04-28 DIAGNOSIS — N95.2 VAGINAL ATROPHY: ICD-10-CM

## 2023-04-28 DIAGNOSIS — N81.11 CYSTOCELE, MIDLINE: Primary | ICD-10-CM

## 2023-04-28 PROCEDURE — A4562 PESSARY, NON RUBBER,ANY TYPE: HCPCS | Performed by: OBSTETRICS & GYNECOLOGY

## 2023-04-28 PROCEDURE — 57160 INSERT PESSARY/OTHER DEVICE: CPT | Performed by: OBSTETRICS & GYNECOLOGY

## 2023-04-28 PROCEDURE — 99213 OFFICE O/P EST LOW 20 MIN: CPT | Mod: 25 | Performed by: OBSTETRICS & GYNECOLOGY

## 2023-04-28 NOTE — PATIENT INSTRUCTIONS
If you have any questions regarding your visit, Please contact your care team.    [facility]: [phone]      [facility] CLINIC HOURS TELEPHONE NUMBER   Maria Victoria Pappas DO.    ANNE-MARIE Kirby-Surgery Scheduler  Saund[facility] - Surgery Scheduler    KUMAR Delgadillo, KUMAR Go RN     Monday, Thursday  [place]  7am-3pm    Tuesday, Wednesday  [place]  7am-3pm    E/O Friday &   [place]    Typical Surgery Days: Thursday or Friday   [facility]  [address] [place] [address] [phone] Phone  [phone] Fax    [address] [address] [phone] Phone    Imaging [phone] Phone    [facility] Labor and Delivery:  [phone] Phone     **Surgeries** Our Surgery Schedulers will contact you to schedule. If you do not receive a call within 3 business days, please call [phone].    Urgent Care locations:  [facility] Saturday and    9 am - 5 pm    Monday-Friday   12 pm - 8 pm  Saturday and    9 am - 5 pm   [phone]       If you need a medication refill, please contact your pharmacy. Please allow 3 business days for your refill to be completed.  As always, Thank you for trusting us with your healthcare needs!

## 2023-04-28 NOTE — PROGRESS NOTES
SUBJECTIVE:       HPI: Teagan Strickland is a 77 year old  who presents today for consultation for pelvic organ prolapse.     Patient has noticed bulging and pressure. This has been present since winter after shoveling more frequently. Symptoms have started to improve though. She has no urinary incontinence. She has no bowel incontinence. She does not have to splint to have a BM or void.     She has had 2 children, all via vaginal delivery. Largest baby was [id number]. She has a history of forceps delivery. No hx of OASIS injury.        Ob Hx:  s/p   OB History    Para Term  AB Living   2 2 2 0 0 2   SAB IAB Ectopic Multiple Live Births   0 0 0 0 2      # Outcome Date GA Lbr Arturo/2nd Weight Sex Delivery Anes PTL Lv   2 Term         DYANA   1 Term         DYANA    .      Gyn Hx: Patient's last menstrual period was 1995.    Patient is sexually active. One male partner   Last pap was 2007 NIL, No history of abnormal paps. Next pap due NA>64yo         Problem list and histories reviewed & adjusted, as indicated.  Additional history: as documented.    Patient Active Problem List   Diagnosis     Seasonal allergic rhinitis     CKD (chronic kidney disease) stage 2, GFR 60-89 ml/min     Gastric ulcer with hemorrhage     Anemia due to blood loss, acute     CARDIOVASCULAR SCREENING; LDL GOAL LESS THAN 130     Past Surgical History:   Procedure Laterality Date     COLONOSCOPY  3/7/2014    Procedure: COLONOSCOPY;  colonoscopy;  Surgeon: Jayme Dillon MD;  [facility]     ESOPHAGOSCOPY, GASTROSCOPY, DUODENOSCOPY (EGD), COMBINED N/A 2017    Procedure: COMBINED ESOPHAGOSCOPY, GASTROSCOPY, DUODENOSCOPY (EGD);  ESOPHAGOSCOPY, GASTROSCOPY, DUODENOSCOPY (EGD);  Surgeon: Mike Reynoso MD;  Location: [facility]     HC DILATION/CURETTAGE DIAG/THER NON OB       HC REMOVAL OF TONSILS,<11 Y/O       ZZHC COLONOSCOPY W BIOPSY  07      Social History     Tobacco Use     Smoking status:  Former     Packs/day: 1.00     Years: 10.00     Pack years: 10.00     Types: Cigarettes     Quit date: 1992     Years since quittin.3     Smokeless tobacco: Never   Vaping Use     Vaping status: Never Used   Substance Use Topics     Alcohol use: Yes     Comment: social      Problem (# of Occurrences) Relation (Name,Age of Onset)    Arthritis (1) Paternal Grandmother    Cancer (1) Maternal Grandfather: prostate    Diabetes (6) Mother: oral, Maternal Grandmother: insulin, Maternal Aunt: insulin, Maternal Uncle: insulin, Brother: x2  oral meds, Brother    Osteoporosis (1) Mother    Respiratory (1) Father: unknown specific problem--didn't keep contact with family    Thyroid Disease (1) Mother    EYE* (1) Mother: cataract            Cholecalciferol (VITAMIN D) 1000 UNIT capsule, Take 2 capsules by mouth daily.  Coenzyme Q10 (COQ-10 PO),   Digestive Enzymes (ENZYME DIGEST PO), 1 tablet daily  estradiol (ESTRACE) 0.1 MG/GM vaginal cream, Place 1 g vaginally twice a week  latanoprost (XALATAN) 0.005 % ophthalmic solution, 1 drop daily  MULTIVITAMINS OR TABS, ONE DAILY  OIL OF OREGANO PO,   Omega-3 Fatty Acids (OMEGA 3 PO),   Probiotic Product (PROBIOTIC DAILY PO), daily  VITAMIN K PO,   pantoprazole (PROTONIX) 40 MG EC tablet, Take 1 tablet (40 mg) by mouth daily Take 30-60 minutes before a meal. (Patient not taking: Reported on 2023)    No current facility-administered medications on file prior to visit.    Allergies   Allergen Reactions     No Known Drug Allergy        ROS:  10 Point review of systems negative other noted above in HPI    OBJECTIVE:     /78   Pulse 83   Wt 62.2 kg (137 lb 1.6 oz)   LMP 1995   BMI 25.08 kg/m    Body mass index is 25.08 kg/m .    Exam:  Constitutional:  Appearance: Well nourished, well developed alert, in no acute distress  Chest:  Respiratory Effort:  Breathing unlabored.   Gastrointestinal:  Abdominal Examination:  Abdomen nontender to palpation, tone normal  without rigidity or guarding, no masses present, umbilicus without lesions  Skin: General Inspection:  No rashes present, no lesions present, no areas of discoloration.  Neurologic:  Mental Status:  Oriented X3.  Normal strength and tone, sensory exam grossly normal, mentation intact and speech normal.    Psychiatric:  Mentation appears normal and affect normal/bright.  : external genitalia without lesions, masses. Vaginal canal pink, mucous atrophic without lesions. Anterior descent with valsalva to 1cm above hymenal ring. Good posterior support. Minimal apical descent. No abnormal discharge. No tenderness with exam.     Please see below for pessary fitting note.    In-Clinic Test Results:  No results found for this or any previous visit (from the past 24 hour(s)).    ASSESSMENT/PLAN:                                                      Teagan Strickland is a 77 year old  who presents today for prolapse consultation      ICD-10-CM    1. Cystocele, midline  N81.11 FIT/INSERT INTRAVAG SUPPORT DEVICE/PESSARY     PESSARY, NON RUBBER,ANY TYPE      2. Vaginal atrophy  N95.2       3. Special screening for malignant neoplasms, colon  Z12.11 Colonoscopy Screening  Referral      4. Screening for osteoporosis  Z13.820 DEXA HIP/PELVIS/SPINE - Future      5. Unspecified menopausal and perimenopausal disorder  N95.9 DEXA HIP/PELVIS/SPINE - Future          Discussed treatment options for pelvic organ prolapse, including conservative options in form of pelvic PT and/or pessary versus surgical options. Patient desires to try Pelvic PT and pessary at this time.   Size 2 Ring Pessary placed in clinic today without complication. Patient tolerated well.     Patient was educated on use: remove every 1-3 months for cleaning, remove before sexual intercourse, and was educated on warning signs: pain, bleeding, abnormal discharge, change in immunocompetent status. Return in 3-6 months for removal and cleaning, or sooner if  needed.  Patient has vaginal estrogen and planning to use for atrophy.    Patient also able to take out at home and instructed on cleaning. All questions answered    Overdue for DEXA and colonoscopy, therefore ordered today      Maria Victoria Pappas DO  [facility]       Pessary fitting     The appropriate size pessary was fitted with the desired support result.  Pt was very comfortable with the device in place.  Pt was allowed to work thru multiple position changes and to make sure she was able to void prior to leaving.  With the placement being successful, she will follow up with us in 3-4 months, sooner with any concerns. If at any time she has pain, severe bleeding, or is unable to void she is to seek immediate medical attention.  All questions answered.  Size 2 Ring Pessary

## 2023-05-01 ENCOUNTER — TELEPHONE (OUTPATIENT)
Dept: OBGYN | Facility: CLINIC | Age: 78
End: 2023-05-01
Payer: COMMERCIAL

## 2023-05-01 NOTE — TELEPHONE ENCOUNTER
Health Call Center    Phone Message    May a detailed message be left on voicemail: yes     Reason for Call: The patient called stating she was not given the instructions for cleaning her pessary. She also stated she is unable to get the vaginal moisturizer from the Pharmacy. They keep telling her to go to the eye clinic. She is wondering once removed and washed, can she use any lube to reinsert, like [place] Jelly? She will be at the [facility] today with her  and hoping to  a hard copy of cleaning instructions. Please contact patient. Thank you    Action Taken: Message routed to:  [facility] p [phone]    Travel Screening: Not Applicable

## 2023-05-01 NOTE — TELEPHONE ENCOUNTER
Letter printed and mailed.    Kate Cruz, [facility]  May 1, 2023

## 2023-05-01 NOTE — LETTER
Dear Teagan,    Here are the instructions for cleaning your pessary that you requested:      If you are able to care for your pessary at home, we recommend that you take it out and clean daily, or as needed. You should use a mild soap with water, rinse and dry it completely, and reinsert it into the vagina immediately or the next morning. It is OK to keep it in for a longer period of time but not more than 3 months at a time.  If you care for your own pessary, we will usually have you come to the office for an examination in 1-3 months. After one year of use, you can come in 2 to 3 times a year. Most pessaries last for several years.   It is not uncommon for the pessary to fall out when you are having a bowel movement. Check the toilet before you flush. It the pessary does fall out, you can clean it with soap and water, then soak it for 20 minutes in rubbing alcohol. After this, soak it for 20 minutes in water, and wash again with soap and water. Rinse well. You may then insert it in the vagina.  If you are unable to remove and reinsert your own pessary, you should come to the office for cleaning and examination every three months. This may be spaced out further depending on your history.  If you experience pain, difficulty going to the bathroom or vaginal bleeding, please contact the office to be seen.    Thank you,  KUMAR Delgadillo

## 2023-05-01 NOTE — TELEPHONE ENCOUNTER
Per 4/28 OV with Dr. Pappas for a pessary insertion.    Spoke with pt and explained how to clean pessary and answered all of her questions.      Pt also requested this information be mailed to her home address.  Letter written with pessary cleaning instructions.    Routing to [facility] to print pended letter and mail to pt.    Leona Bryan RN

## 2023-08-13 ENCOUNTER — NURSE TRIAGE (OUTPATIENT)
Dept: NURSING | Facility: CLINIC | Age: 78
End: 2023-08-13
Payer: COMMERCIAL

## 2023-08-13 NOTE — TELEPHONE ENCOUNTER
Nurse Triage SBAR    Is this a 2nd Level Triage? NO    Situation: Blood in urine    Background: Patient states that she has a pessary and that she tried to put it in last night.  She states that this morning it felt sore and she had some blood in her urine. Also stated that she has a little bit of burning as well.  She states that she does not have a fever and is emptying her bladder.      Assessment: Blood in urine    Protocol Recommended Disposition:   See PCP Within 24 Hours    Recommendation: Care advice given per protocol and call back precautions discussed.  Patient was warm transferred to scheduling to make an appointment. If none, patient was instructed to go to urgent care.  Patient verbalized understanding and agreement with the plan of care.     N/A    Does the patient meet one of the following criteria for ADS visit consideration? 16+ years old, with an [facility] PCP     TIP  Providers, please consider if this condition is appropriate for management at one of our Acute and Diagnostic Services sites.     If patient is a good candidate, please use dotphrase <dot>triageresponse and select Refer to ADS to document.    Reason for Disposition   Pain or burning with passing urine    Additional Information   Negative: Shock suspected (e.g., cold/pale/clammy skin, too weak to stand, low BP, rapid pulse)   Negative: Sounds like a life-threatening emergency to the triager   Negative: Urinary catheter, questions about   Negative: Recent back or abdominal injury   Negative: Recent genital injury   Negative: [1] Unable to urinate (or only a few drops) > 4 hours AND [2] bladder feels very full (e.g., palpable bladder or strong urge to urinate)   Negative: Passing pure blood or large blood clots (i.e., size > a dime) (Exception: dong or small strands)   Negative: Fever > 100.4 F (38.0 C)   Negative: Patient sounds very sick or weak to the triager   Negative: [1] Pain or burning with passing urine AND [2] side (flank) or  back pain present   Negative: Known sickle cell disease   Negative: Taking Coumadin (warfarin) or other strong blood thinner, or known bleeding disorder (e.g., thrombocytopenia)    Protocols used: Urine - Blood In-A-AH

## 2023-08-14 ENCOUNTER — TELEPHONE (OUTPATIENT)
Dept: NURSING | Facility: CLINIC | Age: 78
End: 2023-08-14
Payer: COMMERCIAL

## 2023-08-14 NOTE — TELEPHONE ENCOUNTER
Contacted patient after reading [facility] notes from yesterday Sunday 8/13/2023 patient c/o blood in urine with dysuria at the end of stream.  Additionally reports low back pain.    Advised to be seen today in clinic with provider- FP, is ok too.  If nothing available in clinic today go to urgent care to rule out possible UTI, needs to be seen today.  Patient verbalized understanding of needing to be seen today & agrees with plan. She states there is an urgent care near her home that she has been to before and will go there if nothing available at [facility] in [place].    Patient also plans on calling to make a follow up appointment with Dr. Pappas re: her pessery difficulties.

## 2023-08-14 NOTE — TELEPHONE ENCOUNTER
Patient called back back.      Patient states she has pain and not able to sleep with the pain.  Patient took some tylenol at 6pm 325mg.    Patient states she is not able to put in the pessery herself so her  inserts it.  She states they cannot use a lot of lubricant due to the pessery being so hard.    Patient is having some speck of blood with urination.    Patient has no fever and no increased back pain than her normal back pain.    Patient states the pressure she feels with the pessery is relieved when she has bowel movements.    Care advise: will route message to OB to follow up. Continue to drink fluids, ok to take tylenol for pain.    Mitra Sawyer RN   08/14/23 12:29 AM  [facility] Nurse Advisor

## 2023-08-15 NOTE — TELEPHONE ENCOUNTER
Maria Victoria Pappas DO  [facility] minutes ago (7:39 AM)     KK  Spotting from vaginal irritation from removing and placing pessary common. If patient unable to replace or persistent/worsening symptoms, recommend she come in for evaluation. She should use lubricant to place the device, preferably what comes in the package.    Thank you,  Maria Victoria Pappas DO   RN called and spoke with pt and relayed above advisement.    Patient verbalized understanding and agreed to plan.     Pt has appt on 8/29 for pessary evaluation as she worries it is too big. Pt was seen in [facility] yesterday and being treated for bacterial infection.    Abbi López RN on 8/15/2023 at 8:09 AM

## 2023-08-24 NOTE — PATIENT INSTRUCTIONS
If you have any questions regarding your visit, Please contact your care team.    [facility]: [phone]      [facility] CLINIC HOURS TELEPHONE NUMBER   Maria Victoria Pappas DO.    ANNE-MARIE Kirby-Surgery Scheduler  Saundra - Surgery Scheduler    KUMAR Delgadillo, KUMAR Go RN     Monday, Thursday  [place]  7am-3pm    Tuesday, Wednesday  [place]  7am-3pm    E/O Friday &   [place]    Typical Surgery Days: Thursday or Friday   [facility]  [address] [place] [address] [phone] Phone  [phone] Fax    [address] [address] [phone] Phone    Imaging [phone] Phone    [facility] Labor and Delivery:  [phone] Phone     **Surgeries** Our Surgery Schedulers will contact you to schedule. If you do not receive a call within 3 business days, please call [phone].    Urgent Care locations:  [facility] Saturday and    9 am - 5 pm    Monday-Friday   12 pm - 8 pm  Saturday and    9 am - 5 pm   [phone]       If you need a medication refill, please contact your pharmacy. Please allow 3 business days for your refill to be completed.  As always, Thank you for trusting us with your healthcare needs!

## 2023-08-29 ENCOUNTER — OFFICE VISIT (OUTPATIENT)
Dept: OBGYN | Facility: CLINIC | Age: 78
End: 2023-08-29
Attending: OBSTETRICS & GYNECOLOGY
Payer: COMMERCIAL

## 2023-08-29 VITALS
SYSTOLIC BLOOD PRESSURE: 116 MMHG | HEART RATE: 69 BPM | DIASTOLIC BLOOD PRESSURE: 68 MMHG | WEIGHT: 138.8 LBS | BODY MASS INDEX: 25.39 KG/M2

## 2023-08-29 DIAGNOSIS — D22.9 BENIGN SKIN MOLE: ICD-10-CM

## 2023-08-29 DIAGNOSIS — N95.2 VAGINAL ATROPHY: ICD-10-CM

## 2023-08-29 DIAGNOSIS — Z12.11 SPECIAL SCREENING FOR MALIGNANT NEOPLASMS, COLON: ICD-10-CM

## 2023-08-29 DIAGNOSIS — Z13.820 SCREENING FOR OSTEOPOROSIS: ICD-10-CM

## 2023-08-29 DIAGNOSIS — N81.11 CYSTOCELE, MIDLINE: Primary | ICD-10-CM

## 2023-08-29 PROCEDURE — 99214 OFFICE O/P EST MOD 30 MIN: CPT | Performed by: OBSTETRICS & GYNECOLOGY

## 2023-08-29 NOTE — PROGRESS NOTES
SUBJECTIVE:       HPI: Teagan Strickland is a 77 year old  who presents today for consultation for pelvic organ prolapse.      has been helping her take out and put back in. This was fine at first but the last time she had some pain and bleeding with replacement.    Diagnosed with UTI at [place] last week.      Ob Hx:  s/p   OB History    Para Term  AB Living   2 2 2 0 0 2   SAB IAB Ectopic Multiple Live Births   0 0 0 0 2      # Outcome Date GA Lbr Arturo/2nd Weight Sex Delivery Anes PTL Lv   2 Term         DYANA   1 Term         DYANA    .      Gyn Hx: Patient's last menstrual period was 1995.    Patient is sexually active. One male partner   Last pap was 2007 NIL, No history of abnormal paps. Next pap due NA>64yo         Problem list and histories reviewed & adjusted, as indicated.  Additional history: as documented.    Patient Active Problem List   Diagnosis    Seasonal allergic rhinitis    CKD (chronic kidney disease) stage 2, GFR 60-89 ml/min    Gastric ulcer with hemorrhage    Anemia due to blood loss, acute    CARDIOVASCULAR SCREENING; LDL GOAL LESS THAN 130     Past Surgical History:   Procedure Laterality Date    COLONOSCOPY  3/7/2014    Procedure: COLONOSCOPY;  colonoscopy;  Surgeon: Jayme Dillon MD;  [facility]    ESOPHAGOSCOPY, GASTROSCOPY, DUODENOSCOPY (EGD), COMBINED N/A 2017    Procedure: COMBINED ESOPHAGOSCOPY, GASTROSCOPY, DUODENOSCOPY (EGD);  ESOPHAGOSCOPY, GASTROSCOPY, DUODENOSCOPY (EGD);  Surgeon: Mike Reynoso MD;  Location: [facility]    HC DILATION/CURETTAGE DIAG/THER NON OB      HC REMOVAL OF TONSILS,<11 Y/O      ZZHC COLONOSCOPY W BIOPSY  07      Social History     Tobacco Use    Smoking status: Former     Packs/day: 1.00     Years: 10.00     Pack years: 10.00     Types: Cigarettes     Quit date: 1992     Years since quittin.6    Smokeless tobacco: Never   Substance Use Topics    Alcohol use: Yes     Comment:  social      Problem (# of Occurrences) Relation (Name,Age of Onset)    Arthritis (1) Paternal Grandmother    Cancer (1) Maternal Grandfather: prostate    Diabetes (6) Mother: oral, Maternal Grandmother: insulin, Maternal Aunt: insulin, Maternal Uncle: insulin, Brother: x2  oral meds, Brother    Osteoporosis (1) Mother    Respiratory (1) Father: unknown specific problem--didn't keep contact with family    Thyroid Disease (1) Mother    EYE* (1) Mother: cataract              Cholecalciferol (VITAMIN D) 1000 UNIT capsule, Take 2 capsules by mouth daily.  Coenzyme Q10 (COQ-10 PO),   Digestive Enzymes (ENZYME DIGEST PO), 1 tablet daily  latanoprost (XALATAN) 0.005 % ophthalmic solution, 1 drop daily  MULTIVITAMINS OR TABS, ONE DAILY  OIL OF OREGANO PO,   Omega-3 Fatty Acids (OMEGA 3 PO),   Probiotic Product (PROBIOTIC DAILY PO), daily  VITAMIN K PO,   estradiol (ESTRACE) 0.1 MG/GM vaginal cream, Place 1 g vaginally twice a week (Patient not taking: Reported on 8/29/2023)  pantoprazole (PROTONIX) 40 MG EC tablet, Take 1 tablet (40 mg) by mouth daily Take 30-60 minutes before a meal. (Patient not taking: Reported on 4/28/2023)    No current facility-administered medications on file prior to visit.    Allergies   Allergen Reactions    No Known Drug Allergy        ROS:  10 Point review of systems negative other noted above in HPI    OBJECTIVE:     /68   Pulse 69   Wt 63 kg (138 lb 12.8 oz)   LMP 01/01/1995   BMI 25.39 kg/m    Body mass index is 25.39 kg/m .    Exam:  Constitutional:  Appearance: Well nourished, well developed alert, in no acute distress  Skin: General Inspection:  No rashes present, no lesions present, no areas of discoloration.  Neurologic:  Mental Status:  Oriented X3.  Normal strength and tone, sensory exam grossly normal, mentation intact and speech normal.    Psychiatric:  Mentation appears normal and affect normal/bright.  : external genitalia without lesions, masses. Vaginal canal pink,  mucous atrophic without lesions. Anterior descent with valsalva to 1cm above hymenal ring. Good posterior support. Minimal apical descent. No abnormal discharge. No tenderness with exam.     Please see below for pessary fitting note.    In-Clinic Test Results:  No results found for this or any previous visit (from the past 24 hour(s)).    ASSESSMENT/PLAN:                                                      Teagan Strickland is a 77 year old  who presents today for pessary maintenance       ICD-10-CM    1. Cystocele, midline  N81.11       2. Vaginal atrophy  N95.2       3. Screening for osteoporosis  Z13.820       4. Special screening for malignant neoplasms, colon  Z12.11       5. Benign skin mole  D22.9 Adult Dermatology Referral            Discussed treatment options for pelvic organ prolapse, including conservative options in form of pelvic PT and/or pessary versus surgical options. Patient desires to try Pelvic PT and pessary at this time.   Size 2 Ring Pessary placed in clinic today without complication. Patient tolerated well.    Patient was educated on use: remove every 1-3 months for cleaning, remove before sexual intercourse, and was educated on warning signs: pain, bleeding, abnormal discharge, change in immunocompetent status. Return in 3-6 months for removal and cleaning, or sooner if needed.  Patient has vaginal estrogen and planning to use for atrophy.  Reviewed in detail removal and placement of pessary in extensive detail, including videos for teaching. Patient and  planning to try again at home and will follow-up in 3 months, sooner if needed.    Planning to set up already ordered DEXA and colonoscopy.  Derm referral placed for mole skin check.        Maria Victoria Pappas DO  [facility]

## (undated) RX ORDER — FENTANYL CITRATE 50 UG/ML
INJECTION, SOLUTION INTRAMUSCULAR; INTRAVENOUS
Status: DISPENSED
Start: 2017-07-10

## (undated) RX ORDER — FENTANYL CITRATE 50 UG/ML
INJECTION, SOLUTION INTRAMUSCULAR; INTRAVENOUS
Status: DISPENSED
Start: 2017-09-01